# Patient Record
Sex: FEMALE | Race: BLACK OR AFRICAN AMERICAN | NOT HISPANIC OR LATINO | Employment: UNEMPLOYED | URBAN - METROPOLITAN AREA
[De-identification: names, ages, dates, MRNs, and addresses within clinical notes are randomized per-mention and may not be internally consistent; named-entity substitution may affect disease eponyms.]

---

## 2020-12-03 ENCOUNTER — APPOINTMENT (EMERGENCY)
Dept: CT IMAGING | Facility: HOSPITAL | Age: 50
End: 2020-12-03
Payer: COMMERCIAL

## 2020-12-03 ENCOUNTER — HOSPITAL ENCOUNTER (EMERGENCY)
Facility: HOSPITAL | Age: 50
Discharge: HOME | End: 2020-12-03
Attending: EMERGENCY MEDICINE | Admitting: EMERGENCY MEDICINE
Payer: COMMERCIAL

## 2020-12-03 VITALS
SYSTOLIC BLOOD PRESSURE: 169 MMHG | TEMPERATURE: 97.6 F | HEART RATE: 62 BPM | HEIGHT: 63 IN | RESPIRATION RATE: 16 BRPM | DIASTOLIC BLOOD PRESSURE: 73 MMHG | OXYGEN SATURATION: 98 %

## 2020-12-03 DIAGNOSIS — R00.2 PALPITATIONS: Primary | ICD-10-CM

## 2020-12-03 DIAGNOSIS — R42 LIGHTHEADEDNESS: ICD-10-CM

## 2020-12-03 DIAGNOSIS — R42 DIZZINESS: ICD-10-CM

## 2020-12-03 LAB
ANION GAP SERPL CALCULATED.3IONS-SCNC: 9 MMOL/L (ref 4–13)
BASOPHILS # BLD AUTO: 0.05 THOUSANDS/ΜL (ref 0–0.1)
BASOPHILS NFR BLD AUTO: 1 % (ref 0–1)
BUN SERPL-MCNC: 9 MG/DL (ref 5–25)
CALCIUM SERPL-MCNC: 8.8 MG/DL (ref 8.3–10.1)
CHLORIDE SERPL-SCNC: 108 MMOL/L (ref 100–108)
CO2 SERPL-SCNC: 27 MMOL/L (ref 21–32)
CREAT SERPL-MCNC: 0.82 MG/DL (ref 0.6–1.3)
EOSINOPHIL # BLD AUTO: 0.06 THOUSAND/ΜL (ref 0–0.61)
EOSINOPHIL NFR BLD AUTO: 1 % (ref 0–6)
ERYTHROCYTE [DISTWIDTH] IN BLOOD BY AUTOMATED COUNT: 13.1 % (ref 11.6–15.1)
GFR SERPL CREATININE-BSD FRML MDRD: 95 ML/MIN/1.73SQ M
GLUCOSE SERPL-MCNC: 99 MG/DL (ref 65–140)
HCT VFR BLD AUTO: 40.9 % (ref 34.8–46.1)
HGB BLD-MCNC: 13.2 G/DL (ref 11.5–15.4)
IMM GRANULOCYTES # BLD AUTO: 0.04 THOUSAND/UL (ref 0–0.2)
IMM GRANULOCYTES NFR BLD AUTO: 0 % (ref 0–2)
LYMPHOCYTES # BLD AUTO: 1.19 THOUSANDS/ΜL (ref 0.6–4.47)
LYMPHOCYTES NFR BLD AUTO: 12 % (ref 14–44)
MAGNESIUM SERPL-MCNC: 1.9 MG/DL (ref 1.6–2.6)
MCH RBC QN AUTO: 27.5 PG (ref 26.8–34.3)
MCHC RBC AUTO-ENTMCNC: 32.3 G/DL (ref 31.4–37.4)
MCV RBC AUTO: 85 FL (ref 82–98)
MONOCYTES # BLD AUTO: 0.53 THOUSAND/ΜL (ref 0.17–1.22)
MONOCYTES NFR BLD AUTO: 5 % (ref 4–12)
NEUTROPHILS # BLD AUTO: 8.05 THOUSANDS/ΜL (ref 1.85–7.62)
NEUTS SEG NFR BLD AUTO: 81 % (ref 43–75)
NRBC BLD AUTO-RTO: 0 /100 WBCS
PLATELET # BLD AUTO: 200 THOUSANDS/UL (ref 149–390)
PMV BLD AUTO: 11.1 FL (ref 8.9–12.7)
POTASSIUM SERPL-SCNC: 3.5 MMOL/L (ref 3.5–5.3)
RBC # BLD AUTO: 4.8 MILLION/UL (ref 3.81–5.12)
SODIUM SERPL-SCNC: 144 MMOL/L (ref 136–145)
TROPONIN I SERPL-MCNC: <0.02 NG/ML
TSH SERPL DL<=0.05 MIU/L-ACNC: 1.77 UIU/ML (ref 0.36–3.74)
WBC # BLD AUTO: 9.92 THOUSAND/UL (ref 4.31–10.16)

## 2020-12-03 PROCEDURE — 70496 CT ANGIOGRAPHY HEAD: CPT

## 2020-12-03 PROCEDURE — 83735 ASSAY OF MAGNESIUM: CPT | Performed by: EMERGENCY MEDICINE

## 2020-12-03 PROCEDURE — 84484 ASSAY OF TROPONIN QUANT: CPT | Performed by: EMERGENCY MEDICINE

## 2020-12-03 PROCEDURE — 70498 CT ANGIOGRAPHY NECK: CPT

## 2020-12-03 PROCEDURE — 80048 BASIC METABOLIC PNL TOTAL CA: CPT | Performed by: EMERGENCY MEDICINE

## 2020-12-03 PROCEDURE — 93005 ELECTROCARDIOGRAM TRACING: CPT

## 2020-12-03 PROCEDURE — G1004 CDSM NDSC: HCPCS

## 2020-12-03 PROCEDURE — 84443 ASSAY THYROID STIM HORMONE: CPT | Performed by: EMERGENCY MEDICINE

## 2020-12-03 PROCEDURE — 99285 EMERGENCY DEPT VISIT HI MDM: CPT

## 2020-12-03 PROCEDURE — 96365 THER/PROPH/DIAG IV INF INIT: CPT

## 2020-12-03 PROCEDURE — 85025 COMPLETE CBC W/AUTO DIFF WBC: CPT | Performed by: EMERGENCY MEDICINE

## 2020-12-03 PROCEDURE — 36415 COLL VENOUS BLD VENIPUNCTURE: CPT | Performed by: EMERGENCY MEDICINE

## 2020-12-03 PROCEDURE — 99285 EMERGENCY DEPT VISIT HI MDM: CPT | Performed by: EMERGENCY MEDICINE

## 2020-12-03 RX ADMIN — SODIUM CHLORIDE, SODIUM LACTATE, POTASSIUM CHLORIDE, AND CALCIUM CHLORIDE 1000 ML: .6; .31; .03; .02 INJECTION, SOLUTION INTRAVENOUS at 19:39

## 2020-12-03 RX ADMIN — IOHEXOL 100 ML: 350 INJECTION, SOLUTION INTRAVENOUS at 21:23

## 2020-12-04 LAB
ATRIAL RATE: 71 BPM
P AXIS: 75 DEGREES
PR INTERVAL: 156 MS
QRS AXIS: 13 DEGREES
QRSD INTERVAL: 106 MS
QT INTERVAL: 418 MS
QTC INTERVAL: 454 MS
T WAVE AXIS: 75 DEGREES
VENTRICULAR RATE: 71 BPM

## 2020-12-04 PROCEDURE — 93010 ELECTROCARDIOGRAM REPORT: CPT | Performed by: INTERNAL MEDICINE

## 2021-01-07 ENCOUNTER — TELEMEDICINE (OUTPATIENT)
Dept: DERMATOLOGY | Age: 51
End: 2021-01-07
Payer: COMMERCIAL

## 2021-01-07 DIAGNOSIS — L81.1 MELASMA: Primary | ICD-10-CM

## 2021-01-07 DIAGNOSIS — L81.8 POSTINFLAMMATORY HYPOPIGMENTATION: ICD-10-CM

## 2021-01-07 DIAGNOSIS — L57.8 ACTINIC SKIN DAMAGE: ICD-10-CM

## 2021-01-07 DIAGNOSIS — L21.9 SEBORRHEA: ICD-10-CM

## 2021-01-07 PROCEDURE — 99204 OFFICE O/P NEW MOD 45 MIN: CPT | Performed by: DERMATOLOGY

## 2021-01-07 RX ORDER — ASPIRIN 325 MG
325 TABLET ORAL DAILY
COMMUNITY

## 2021-01-07 RX ORDER — OMEPRAZOLE 40 MG/1
40 CAPSULE, DELAYED RELEASE ORAL DAILY
COMMUNITY

## 2021-01-07 RX ORDER — TRIAMCINOLONE ACETONIDE 0.25 MG/G
CREAM TOPICAL DAILY
Qty: 80 G | Refills: 1 | Status: SHIPPED | OUTPATIENT
Start: 2021-01-07 | End: 2021-07-27 | Stop reason: SDUPTHER

## 2021-01-07 NOTE — PROGRESS NOTES
Virtual Regular Visit      Assessment/Plan:    Problem List Items Addressed This Visit     None               Reason for visit is   Chief Complaint   Patient presents with    Virtual Regular Visit        Encounter provider Dori Pompa MD    Provider located at [place] [id number] [phone]      Recent Visits  No visits were found meeting these conditions  Showing recent visits within past 7 days and meeting all other requirements     Future Appointments  No visits were found meeting these conditions  Showing future appointments within next 150 days and meeting all other requirements        The patient was identified by name and date of birth  Sun White was informed that this is a telemedicine visit and that the visit is being conducted through [facility] and patient was informed that this is a secure, HIPAA-compliant platform  She agrees to proceed     My office door was closed  The patient was notified the following individuals were present in the room [facility]  She acknowledged consent and understanding of privacy and security of the video platform  The patient has agreed to participate and understands they can discontinue the visit at any time  Patient is aware this is a billable service  Subjective  Sun White is a 44 y o  female    HPI     Past Medical History:   Diagnosis Date    Arterial stenosis ([address] )     Splenic infarct     Stroke [facility])        Past Surgical History:   Procedure Laterality Date     SECTION         Current Outpatient Medications   Medication Sig Dispense Refill    aspirin 325 mg tablet Take 325 mg by mouth daily      omeprazole (PriLOSEC) 40 MG capsule Take 40 mg by mouth daily       No current facility-administered medications for this visit           Allergies   Allergen Reactions    Penicillin G Benzathine Shortness Of Breath     Other reaction(s): (PIMS) shortness of breath    Erythromycin Rash     Other reaction(s): (PIMS)       Review of Systems    Video Exam    There were no vitals filed for this visit  Physical Exam     I spent 15 minutes directly with the patient during this visit      VIRTUAL VISIT [address] [facility] [address] acknowledges that she has consented to an online visit or consultation  She understands that the online visit is based solely on information provided by her, and that, in the absence of a face-to-face physical evaluation by the physician, the diagnosis she receives is both limited and provisional in terms of accuracy and completeness  This is not intended to replace a full medical face-to-face evaluation by the physician  Jaimie Bailey understands and accepts these terms  [facility] VIRTUAL Clinic Note     Patient Name: Jaimie Bailey  Encounter Date: [phone]       Have you been cared for by a [facility] [facility] Dermatologist in the last 3 years and, if so, which one? No    · Have you traveled outside of the [address] [place] [address] in the past 3 months or outside of the [facility] area in the last 2 weeks? No     May we call your Preferred Phone number to discuss your specific medical information? Yes     May we leave a detailed message that includes your specific medical information? Yes      Today's Chief Concerns:   Concern #1:  Melasma   Concern #2:      Past Medical History:  Have you personally ever had or currently have any of the following? · Skin cancer (such as Melanoma, Basal Cell Carcinoma, Squamous Cell Carcinoma? (If Yes, please provide more detail)- No  · Eczema: No  · Psoriasis: No  · HIV/AIDS: No  · Hepatitis B or C: No  · Tuberculosis: No  · Systemic Immunosuppression such as Diabetes, Biologic or Immunotherapy, Chemotherapy, Organ Transplantation, Bone Marrow Transplantation (If YES, please provide more detail):  No  · Radiation Treatment (If YES, please provide more detail): No  · Any other major medical conditions/concerns? (If Yes, which types)- YES, hx of protein deficiciency    Social History:     What is/was your primary occupation? unemployed     What are your hobbies/past-times? landscaping    Family History:  Have any of your "first degree relatives" (parent, brother, sister, or child) had any of the following       · Skin cancer such as Melanoma or Merkel Cell Carcinoma or Pancreatic Cancer? No  · Eczema, Asthma, Hay Fever or Seasonal Allergies: YES, mother hx of eczema and asthma, son hx of asthma  · Psoriasis or Psoriatic Arthritis: YES, mother hx of psoriasis  · Do any other medical conditions seem to run in your family? If Yes, what condition and which relatives? YES, mother hx of diabetes hypertension, father hx of diabetes, hypertension and colon cancer    Current Medications:   (please update all dermatological medications before printing patient's AVS!)      Current Outpatient Medications:     aspirin 325 mg tablet, Take 325 mg by mouth daily, Disp: , Rfl:     omeprazole (PriLOSEC) 40 MG capsule, Take 40 mg by mouth daily, Disp: , Rfl:       Review of Systems:  Have you recently had or currently have any of the following? If YES, what are you doing for the problem? · Fever, chills or unintended weight loss: No  · Sudden loss or change in your vision: No  · Nausea, vomiting or blood in your stool: No  · Painful or swollen joints: No  · Wheezing or cough: No  · Changing mole or non-healing wound: No  · Nosebleeds: No  · Excessive sweating: No  · Easy or prolonged bleeding? No  · Over the last 2 weeks, how often have you been bothered by the following problems? · Taking little interest or pleasure in doing things: 1 - Not at All  · Feeling down, depressed, or hopeless: 1 - Not at All  · Rapid heartbeat with epinephrine:  No    · FEMALES ONLY:    · Are you pregnant or planning to become pregnant?  No  · Are you currently or planning to be nursing or breast feeding? No    · Any known allergies? Allergies   Allergen Reactions    Penicillin G Benzathine Shortness Of Breath     Other reaction(s): (PIMS) shortness of breath    Erythromycin Rash     Other reaction(s): (PIMS)   ·       Physical Exam:     Was a chaperone (Derm Clinical Assistant) present throughout the entire virtual Physical Exam? Yes     Did the Dermatology Team specifically  the patient on the technical and practical limitations of a virtual Physical Exam? Yes}  o Did the patient ultimately request or accept a virtual Physical Exam?  Yes  o Did the patient specifically refuse to have the areas "under-the-bra" examined by the Dermatologist? YES  o Did the patient specifically refuse to have the areas "under-the-underwear" examined by the Dermatologist? YES    CONSTITUTIONAL:   Appearance: alert, well appearing, and in no distress  PSYCH: Normal mood and affect  EYES: Normal appearing eyes with normal color; no obvious deformities  ENT: Normal ears, nose, lips and neck with normal color and no obvious deformities; no obvious difficulty swallowing  CARDIOVASCULAR: No obvious edema; no obvious jugular venous distension  RESPIRATORY: Normal appearing respirations; no obvious shortness of breath  HEME/LYMPH/IMMUNO:  Normal color without obvious pallor, jaundice, petechiae or bleeding; no obvious cervical chain masses    SKIN:  FULL ORGAN SYSTEM EXAM  Face Normal except as noted below in Assessment         MELASMA and/or ACTINIC DAMAGE with   SEBHORRHEA WITH POST INFLAMMATORY HYPOPIGMENTATION RULE OUT VITILIGO    Physical Exam:   Anatomic Location Affected:  Central and edge of face   Morphological Description:  Pink and hypopigmented; edge of face- blotchy tan and brown patches   Pertinent Positives:   Pertinent Negatives: Additional History of Present Condition:  Patient reports dx with melasma 10 years ago  Denies treating at this time  Grandson and 2 brothers dx with vitiligo  Assessment and Plan: Patient has hypopigmentation with suggestion of mild seborrhea in central face with blotchy peripheral hyperpigmentation on sides of face  Since there is a family history of vitiligo she needs to be examined via wood's light to exclude this  Her outdoor occupation creates the melasma and hyperpigmentation from actinic damage  We will start with a topical steroid only and if there is no vitiligo add tretinoin and hydroquinone  Based on a thorough discussion of this condition and the management approach to it (including a comprehensive discussion of the known risks, side effects and potential benefits of treatment), the patient (family) agrees to implement the following specific plan:   Apply Triamcinolone 0 025% cream to face once a day   Recommend an in office visit to confirm diagnosis   Neutrogena Daily Defense SPF 50+ at least three times a day   polypodium leucotomas daily for sun protection and improvement in melasma    What is melasma? Melasma is a chronic skin disorder that results in symmetrical, blotchy, brownish facial pigmentation  It can lead to considerable embarrassment and distress  This form of facial pigmentation is sometimes called chloasma, but as this means green skin, the term melasma (brown skin) is preferred  Who gets melasma? Melasma is more common in women than in men; only 1-in-4 to 1-in-20 affected individuals are male, depending on the population studied  It generally starts between the age of 21 and 36 years, but it can begin in childhood or not until middle age  Melasma is more common in people that tan well or have naturally brown skin (Man skin types 3 and 4) compared with those who have fair skin (skin types 1 and 2) or black skin (skin types 5 or 6)  What causes melasma? The cause of melasma is complex   The pigmentation is due to overproduction of melanin by the pigment cells, melanocytes, which is taken up by the keratinocytes (epidermal melanosis) and/or deposited in the dermis (dermal melanosis, melanophages)  There is a genetic predisposition to melasma, with at least one-third of patients reporting other family members to be affected  In most people melasma is a chronic disorder  Known triggers for melasma include:   Sun exposure and sun damage--this is the most important avoidable risk factor    Pregnancy--in affected women, the pigment often fades a few months after delivery    Hormone treatments--oral contraceptive pills containing oestrogen and/or progesterone, hormone replacement, intrauterine devices and implants are a factor in about a quarter of affected women    Certain medications (including new targeted therapies for cancer), scented or deodorant soaps, toiletries and cosmetics--these may cause a phototoxic reaction that triggers melasma, which may then persist long term    Hypothyroidism (low levels of circulating thyroid hormone)    Melasma commonly arises in healthy, non-pregnant adults  Lifelong sun exposure causes deposition of pigment within the dermis and this often persists longterm  Exposure to ultraviolet radiation (UVR) deepens the pigmentation because it activates the melanocytes to produce more melanin  Research is attempting to pinpoint the roles of stem cell, neural, vascular and local hormonal factors in promoting melanocyte activation  What are the clinical features of melasma? Melasma presents as macules (freckle-like spots) and larger flat brown patches  These are found on both sides of the face and have an irregular border  There are several distinct patterns     Centrofacial pattern: forehead, cheeks, nose and upper lips    Malar pattern: cheeks and nose    Lateral cheek pattern    Mandibular pattern: jawline    Reddened or inflamed forms of melasma (also called erythrosis pigmentosa faciei)    Poikiloderma of [place]: reddened, photoaging changes seen on the sides of the neck, mostly affecting patients older than 50 years    Brachial type of melasma affecting shoulders and upper arms (also called acquired brachial cutaneous dyschromatosis)  Melasma is sometimes  into epidermal (skin surface), dermal (deeper) and mixed types  A Wood lamp that emits black light (UVA1) may be used to identify the depth of the pigment  Some general classifications include the following:    Epidermal melasma   Well-defined border    Dark brown colour    Appears more obvious under black light    Responds well to treatment    Dermal melasma   Ill-defined border    Light brown or bluish in colour    Unchanged under black light    Responds poorly to treatment    Mixed melasma   The most common type    Combination of bluish, light and dark brown patches    Mixed pattern seen under black light    Partial improvement with treatment    What is the treatment of melasma? Melasma can be very slow to respond to treatment, especially if it has been present for a long time  Treatment may result in irritant contact dermatitis in patients with sensitive skin, and this can result in post-inflammatory pigmentation  Generally a combination of the following measures is helpful  General measures   Discontinue hormonal contraception   Year-round life-long sun protection  Wear a broad-brimmed hat   Use broad-spectrum very high protection factor (SPF 50+) sunscreen applied to the whole face daily, year-round  It should be reapplied every 2 hours if outdoors during the summer months  Sunscreens containing iron oxides are preferred, as they screen out some visible light as well as ultraviolet radiation  Alternatively or as well, use a make-up that contains sunscreen   Use a mild cleanser, and if the skin is dry, a light moisturiser   Cosmetic camouflage (make-up) is invaluable to disguise the pigment  Topical therapy  Tyrosinase inhibitors are the mainstay of treatment   The aim is to prevent new pigment formation by inhibiting formation of melanin by the melanocytes   Hydroquinone 2-4% as cream or lotion, applied accurately to pigmented areas at night for 2-4 months  This may cause contact dermatitis (stinging and redness) in 25% of patients  It should not be used in higher concentration or for prolonged courses as it has been associated with ochronosis (a bluish grey discolouration similar to that seen in alkaptonuria)   Azelaic acid cream, lotion or gel can be applied twice daily long term, and is safe in pregnancy  This may also sting   Kojic acid or kojic acid dipalmitate is often included in formulations, as it binds copper, required by L-DOPA (a cofactor of tyrosinase)  Kojic acid can cause irritant contact dermatitis and less commonly, allergic contact dermatitis   The mechanism of action of cysteamine cream is unclear, but is thought to involve inhibition of tyrosinase  A study of 50 patients with melasma found cysteamine cream to be significantly more effective than placebo cream     Ascorbic acid (vitamin C) also acts through copper to inhibit pigment production  It is well tolerated but highly unstable, so is usually combined with other agents   Methimazole (antithyroid drug) cream has been reported to reduce melanin synthesis and pigmentation in hydroquinone-resistant melasma   New agents under investigation include zinc sulfate mequinol, arbutin and deoxyarbutin (from berries), licorice extract, rucinol, resveratrol, 4-hydroxy-anisole, 2,5-dimethyl-4-hydroxy-3(2H)-furanone and/or N-acetyl glucosamine    Other active compounds used for melasma include:   Topical corticosteroids such as hydrocortisone  These work quickly to fade the colour and reduce the likelihood of contact dermatitis caused by other agents  Potent topical steroids are best avoided due to their potential to cause adverse effects      Soybean extract, which is thought to reduce the transfer of pigment from melanocytes to skin cells (keratinocytes) and to inhibit receptors   Tranexamic acid has been used experimentally for melasma as a cream or injected into the skin (mesotherapy), showing some benefit  It may cause allergy or irritation  Superficial or epidermal pigment can be peeled off  Peeling can also allow tyrosinase inhibitors to penetrate more effectively  These must be done carefully as peels may also induce post-inflammatory pigmentation   Topical alpha hydroxyacids including glycolic acid and lactic acid, as creams or as repeated superficial chemical peels, remove the surface skin and their low pH inhibits the activity of tyrosinase   Topical retinoids, such as tretinoin (a prescription medicine) are effective  Tretinoin can be hard to tolerate and sometimes causes contact dermatitis  Do not use during pregnancy   Salicylic acid, a common peeling ingredient in skin creams, can also be used for chemical peels, but it is not very effective in melasma  The most successful formulation has been a combination of hydroquinone, tretinoin, and moderate potency topical steroid  This has been found to result in improvement or clearance in up to 60-80% of those treated  Many other combinations of topical agents are in common use, as they are more effective than any one alone  However, these products are often expensive  Oral treatment of melasma  Oral medications for melasma are under investigation, including tranexamic acid  Tranexamic acid is a lysine analogue that inhibits plasmin and is usually used orally to stop bleeding  It reduces production of prostaglandins, the precursors of tyrosine  In low dose, tranexamic acid has been reported to be effective and safe in the treatment of melasma, providing patients have been carefully selected and are at low risk of thromboembolic disease    Glutathione is also under investigation as a systemic skin whitening agent, but has potentially serious adverse effects  Devices used to treat melasma  The ideal treatment for melasma would destroy the pigment, while leaving the cells alone  Unfortunately, this is hard to achieve  Machines can be used to remove epidermal pigmentation but with caution--over-treatment may cause postinflammatory pigmentation  Patients should be pretreated with a tyrosinase inhibitor (see above)  Fractional lasers, Q-switched Nd:YAG lasers and intense pulsed light (IPL) appear to be the most suitable options  Several treatments may be necessary and post-inflammatory hyperpigmentation may complicate recovery  Carbon dioxide or erbium:YAG resurfacing lasers, pigment lasers (Q-switched gary and Alexandrite devices) and mechanical dermabrasion and microdermabrasion should be used with caution in the treatment of melasma  What is the outcome of treatment of melasma? Results take time and the above measures are rarely completely successful  Unfortunately, even in those that get a good result from treatment, pigmentation may reappear on exposure to summer sun and/or because of hormonal factors  New topical and oral agents are being studied and offer hope for effective treatments in the future                  Scribe Attestation    I,:  Yeimi Wren am acting as a scribe while in the presence of the attending physician :       I,:  Lizzette Madden MD personally performed the services described in this documentation    as scribed in my presence :

## 2021-01-07 NOTE — PATIENT INSTRUCTIONS
MELASMA  SEBHORRHEA WITH POST INFLAMMATORY HYPOPIGMENTATION RULE OUT VITILIGO    Assessment and Plan:  Based on a thorough discussion of this condition and the management approach to it (including a comprehensive discussion of the known risks, side effects and potential benefits of treatment), the patient (family) agrees to implement the following specific plan:   Apply Triamcinolone 0 025% cream to face once a day   Recommend an in office visit to confirm diagnosis   Neutrogena Daily Defense SPF 50+ at least three times a day    What is melasma? Melasma is a chronic skin disorder that results in symmetrical, blotchy, brownish facial pigmentation  It can lead to considerable embarrassment and distress  This form of facial pigmentation is sometimes called chloasma, but as this means green skin, the term melasma (brown skin) is preferred  Who gets melasma? Melasma is more common in women than in men; only 1-in-4 to 1-in-20 affected individuals are male, depending on the population studied  It generally starts between the age of 21 and 36 years, but it can begin in childhood or not until middle age  Melasma is more common in people that tan well or have naturally brown skin (Man skin types 3 and 4) compared with those who have fair skin (skin types 1 and 2) or black skin (skin types 5 or 6)  What causes melasma? The cause of melasma is complex  The pigmentation is due to overproduction of melanin by the pigment cells, melanocytes, which is taken up by the keratinocytes (epidermal melanosis) and/or deposited in the dermis (dermal melanosis, melanophages)  There is a genetic predisposition to melasma, with at least one-third of patients reporting other family members to be affected  In most people melasma is a chronic disorder    Known triggers for melasma include:   Sun exposure and sun damage--this is the most important avoidable risk factor    Pregnancy--in affected women, the pigment often fades a few months after delivery    Hormone treatments--oral contraceptive pills containing oestrogen and/or progesterone, hormone replacement, intrauterine devices and implants are a factor in about a quarter of affected women    Certain medications (including new targeted therapies for cancer), scented or deodorant soaps, toiletries and cosmetics--these may cause a phototoxic reaction that triggers melasma, which may then persist long term    Hypothyroidism (low levels of circulating thyroid hormone)    Melasma commonly arises in healthy, non-pregnant adults  Lifelong sun exposure causes deposition of pigment within the dermis and this often persists longterm  Exposure to ultraviolet radiation (UVR) deepens the pigmentation because it activates the melanocytes to produce more melanin  Research is attempting to pinpoint the roles of stem cell, neural, vascular and local hormonal factors in promoting melanocyte activation  What are the clinical features of melasma? Melasma presents as macules (freckle-like spots) and larger flat brown patches  These are found on both sides of the face and have an irregular border  There are several distinct patterns   Centrofacial pattern: forehead, cheeks, nose and upper lips    Malar pattern: cheeks and nose    Lateral cheek pattern    Mandibular pattern: jawline    Reddened or inflamed forms of melasma (also called erythrosis pigmentosa faciei)    Poikiloderma of [place]: reddened, photoaging changes seen on the sides of the neck, mostly affecting patients older than 50 years    Brachial type of melasma affecting shoulders and upper arms (also called acquired brachial cutaneous dyschromatosis)  Melasma is sometimes  into epidermal (skin surface), dermal (deeper) and mixed types  A Wood lamp that emits black light (UVA1) may be used to identify the depth of the pigment    Some general classifications include the following:    Epidermal melasma   Well-defined border    Dark brown colour    Appears more obvious under black light    Responds well to treatment    Dermal melasma   Ill-defined border    Light brown or bluish in colour    Unchanged under black light    Responds poorly to treatment    Mixed melasma   The most common type    Combination of bluish, light and dark brown patches    Mixed pattern seen under black light    Partial improvement with treatment    What is the treatment of melasma? Melasma can be very slow to respond to treatment, especially if it has been present for a long time  Treatment may result in irritant contact dermatitis in patients with sensitive skin, and this can result in post-inflammatory pigmentation  Generally a combination of the following measures is helpful  General measures   Discontinue hormonal contraception   Year-round life-long sun protection  Wear a broad-brimmed hat   Use broad-spectrum very high protection factor (SPF 50+) sunscreen applied to the whole face daily, year-round  It should be reapplied every 2 hours if outdoors during the summer months  Sunscreens containing iron oxides are preferred, as they screen out some visible light as well as ultraviolet radiation  Alternatively or as well, use a make-up that contains sunscreen   Use a mild cleanser, and if the skin is dry, a light moisturiser   Cosmetic camouflage (make-up) is invaluable to disguise the pigment  Topical therapy  Tyrosinase inhibitors are the mainstay of treatment  The aim is to prevent new pigment formation by inhibiting formation of melanin by the melanocytes   Hydroquinone 2-4% as cream or lotion, applied accurately to pigmented areas at night for 2-4 months  This may cause contact dermatitis (stinging and redness) in 25% of patients   It should not be used in higher concentration or for prolonged courses as it has been associated with ochronosis (a bluish grey discolouration similar to that seen in alkaptonuria)   Azelaic acid cream, lotion or gel can be applied twice daily long term, and is safe in pregnancy  This may also sting   Kojic acid or kojic acid dipalmitate is often included in formulations, as it binds copper, required by L-DOPA (a cofactor of tyrosinase)  Kojic acid can cause irritant contact dermatitis and less commonly, allergic contact dermatitis   The mechanism of action of cysteamine cream is unclear, but is thought to involve inhibition of tyrosinase  A study of 50 patients with melasma found cysteamine cream to be significantly more effective than placebo cream     Ascorbic acid (vitamin C) also acts through copper to inhibit pigment production  It is well tolerated but highly unstable, so is usually combined with other agents   Methimazole (antithyroid drug) cream has been reported to reduce melanin synthesis and pigmentation in hydroquinone-resistant melasma   New agents under investigation include zinc sulfate mequinol, arbutin and deoxyarbutin (from berries), licorice extract, rucinol, resveratrol, 4-hydroxy-anisole, 2,5-dimethyl-4-hydroxy-3(2H)-furanone and/or N-acetyl glucosamine    Other active compounds used for melasma include:   Topical corticosteroids such as hydrocortisone  These work quickly to fade the colour and reduce the likelihood of contact dermatitis caused by other agents  Potent topical steroids are best avoided due to their potential to cause adverse effects   Soybean extract, which is thought to reduce the transfer of pigment from melanocytes to skin cells (keratinocytes) and to inhibit receptors   Tranexamic acid has been used experimentally for melasma as a cream or injected into the skin (mesotherapy), showing some benefit  It may cause allergy or irritation  Superficial or epidermal pigment can be peeled off  Peeling can also allow tyrosinase inhibitors to penetrate more effectively   These must be done carefully as peels may also induce post-inflammatory pigmentation   Topical alpha hydroxyacids including glycolic acid and lactic acid, as creams or as repeated superficial chemical peels, remove the surface skin and their low pH inhibits the activity of tyrosinase   Topical retinoids, such as tretinoin (a prescription medicine) are effective  Tretinoin can be hard to tolerate and sometimes causes contact dermatitis  Do not use during pregnancy   Salicylic acid, a common peeling ingredient in skin creams, can also be used for chemical peels, but it is not very effective in melasma  The most successful formulation has been a combination of hydroquinone, tretinoin, and moderate potency topical steroid  This has been found to result in improvement or clearance in up to 60-80% of those treated  Many other combinations of topical agents are in common use, as they are more effective than any one alone  However, these products are often expensive  Oral treatment of melasma  Oral medications for melasma are under investigation, including tranexamic acid  Tranexamic acid is a lysine analogue that inhibits plasmin and is usually used orally to stop bleeding  It reduces production of prostaglandins, the precursors of tyrosine  In low dose, tranexamic acid has been reported to be effective and safe in the treatment of melasma, providing patients have been carefully selected and are at low risk of thromboembolic disease  Glutathione is also under investigation as a systemic skin whitening agent, but has potentially serious adverse effects  Devices used to treat melasma  The ideal treatment for melasma would destroy the pigment, while leaving the cells alone  Unfortunately, this is hard to achieve  Machines can be used to remove epidermal pigmentation but with caution--over-treatment may cause postinflammatory pigmentation  Patients should be pretreated with a tyrosinase inhibitor (see above)    Fractional lasers, Q-switched Nd:YAG lasers and intense pulsed light (IPL) appear to be the most suitable options  Several treatments may be necessary and post-inflammatory hyperpigmentation may complicate recovery  Carbon dioxide or erbium:YAG resurfacing lasers, pigment lasers (Q-switched gary and Alexandrite devices) and mechanical dermabrasion and microdermabrasion should be used with caution in the treatment of melasma  What is the outcome of treatment of melasma? Results take time and the above measures are rarely completely successful  Unfortunately, even in those that get a good result from treatment, pigmentation may reappear on exposure to summer sun and/or because of hormonal factors  New topical and oral agents are being studied and offer hope for effective treatments in the future

## 2021-02-16 ENCOUNTER — OFFICE VISIT (OUTPATIENT)
Dept: DERMATOLOGY | Facility: CLINIC | Age: 51
End: 2021-02-16
Payer: COMMERCIAL

## 2021-02-16 VITALS — TEMPERATURE: 98.5 F | BODY MASS INDEX: 33.66 KG/M2 | HEIGHT: 63 IN | WEIGHT: 190 LBS

## 2021-02-16 DIAGNOSIS — L81.1 MELASMA: Primary | ICD-10-CM

## 2021-02-16 PROCEDURE — 99213 OFFICE O/P EST LOW 20 MIN: CPT | Performed by: DERMATOLOGY

## 2021-02-16 RX ORDER — IBUPROFEN 600 MG/1
600 TABLET ORAL 3 TIMES DAILY PRN
COMMUNITY
Start: 2021-02-08 | End: 2022-02-08

## 2021-02-16 NOTE — PATIENT INSTRUCTIONS
Assessment and Plan:  Based on a thorough discussion of this condition and the management approach to it (including a comprehensive discussion of the known risks, side effects and potential benefits of treatment), the patient (family) agrees to implement the following specific plan:   When outside we recommend using a wide brim hat, sunglasses, long sleeve and pants, sunscreen with SPF 36+ with reapplication every 2 hours, or SPF specific clothing    Neutrogena Daily Defense SPF 50+ at least three times a day  [employer]  April Theodore will send a compounding [facility]   Follow up as needed or in 6 months      What is melasma? Melasma is a chronic skin disorder that results in symmetrical, blotchy, brownish facial pigmentation  It can lead to considerable embarrassment and distress  This form of facial pigmentation is sometimes called chloasma, but as this means green skin, the term melasma (brown skin) is preferred  Who gets melasma? Melasma is more common in women than in men; only 1-in-4 to 1-in-20 affected individuals are male, depending on the population studied  It generally starts between the age of 21 and 36 years, but it can begin in childhood or not until middle age  Melasma is more common in people that tan well or have naturally brown skin (Man skin types 3 and 4) compared with those who have fair skin (skin types 1 and 2) or black skin (skin types 5 or 6)  What causes melasma? The cause of melasma is complex  The pigmentation is due to overproduction of melanin by the pigment cells, melanocytes, which is taken up by the keratinocytes (epidermal melanosis) and/or deposited in the dermis (dermal melanosis, melanophages)  There is a genetic predisposition to melasma, with at least one-third of patients reporting other family members to be affected  In most people melasma is a chronic disorder    Known triggers for melasma include:   Sun exposure and sun damage--this is the most important avoidable risk factor    Pregnancy--in affected women, the pigment often fades a few months after delivery    Hormone treatments--oral contraceptive pills containing oestrogen and/or progesterone, hormone replacement, intrauterine devices and implants are a factor in about a quarter of affected women    Certain medications (including new targeted therapies for cancer), scented or deodorant soaps, toiletries and cosmetics--these may cause a phototoxic reaction that triggers melasma, which may then persist long term    Hypothyroidism (low levels of circulating thyroid hormone)    Melasma commonly arises in healthy, non-pregnant adults  Lifelong sun exposure causes deposition of pigment within the dermis and this often persists longterm  Exposure to ultraviolet radiation (UVR) deepens the pigmentation because it activates the melanocytes to produce more melanin  Research is attempting to pinpoint the roles of stem cell, neural, vascular and local hormonal factors in promoting melanocyte activation  What are the clinical features of melasma? Melasma presents as macules (freckle-like spots) and larger flat brown patches  These are found on both sides of the face and have an irregular border  There are several distinct patterns   Centrofacial pattern: forehead, cheeks, nose and upper lips    Malar pattern: cheeks and nose    Lateral cheek pattern    Mandibular pattern: jawline    Reddened or inflamed forms of melasma (also called erythrosis pigmentosa faciei)    Poikiloderma of [place]: reddened, photoaging changes seen on the sides of the neck, mostly affecting patients older than 50 years    Brachial type of melasma affecting shoulders and upper arms (also called acquired brachial cutaneous dyschromatosis)  Melasma is sometimes  into epidermal (skin surface), dermal (deeper) and mixed types   A Wood lamp that emits black light (UVA1) may be used to identify the depth of the pigment  Some general classifications include the following:    Epidermal melasma   Well-defined border    Dark brown colour    Appears more obvious under black light    Responds well to treatment    Dermal melasma   Ill-defined border    Light brown or bluish in colour    Unchanged under black light    Responds poorly to treatment    Mixed melasma   The most common type    Combination of bluish, light and dark brown patches    Mixed pattern seen under black light    Partial improvement with treatment    What is the treatment of melasma? Melasma can be very slow to respond to treatment, especially if it has been present for a long time  Treatment may result in irritant contact dermatitis in patients with sensitive skin, and this can result in post-inflammatory pigmentation  Generally a combination of the following measures is helpful  General measures   Discontinue hormonal contraception   Year-round life-long sun protection  Wear a broad-brimmed hat   Use broad-spectrum very high protection factor (SPF 50+) sunscreen applied to the whole face daily, year-round  It should be reapplied every 2 hours if outdoors during the summer months  Sunscreens containing iron oxides are preferred, as they screen out some visible light as well as ultraviolet radiation  Alternatively or as well, use a make-up that contains sunscreen   Use a mild cleanser, and if the skin is dry, a light moisturiser   Cosmetic camouflage (make-up) is invaluable to disguise the pigment  Topical therapy  Tyrosinase inhibitors are the mainstay of treatment  The aim is to prevent new pigment formation by inhibiting formation of melanin by the melanocytes   Hydroquinone 2-4% as cream or lotion, applied accurately to pigmented areas at night for 2-4 months  This may cause contact dermatitis (stinging and redness) in 25% of patients   It should not be used in higher concentration or for prolonged courses as it has been associated with ochronosis (a bluish grey discolouration similar to that seen in alkaptonuria)   Azelaic acid cream, lotion or gel can be applied twice daily long term, and is safe in pregnancy  This may also sting   Kojic acid or kojic acid dipalmitate is often included in formulations, as it binds copper, required by L-DOPA (a cofactor of tyrosinase)  Kojic acid can cause irritant contact dermatitis and less commonly, allergic contact dermatitis   The mechanism of action of cysteamine cream is unclear, but is thought to involve inhibition of tyrosinase  A study of 50 patients with melasma found cysteamine cream to be significantly more effective than placebo cream     Ascorbic acid (vitamin C) also acts through copper to inhibit pigment production  It is well tolerated but highly unstable, so is usually combined with other agents   Methimazole (antithyroid drug) cream has been reported to reduce melanin synthesis and pigmentation in hydroquinone-resistant melasma   New agents under investigation include zinc sulfate mequinol, arbutin and deoxyarbutin (from berries), licorice extract, rucinol, resveratrol, 4-hydroxy-anisole, 2,5-dimethyl-4-hydroxy-3(2H)-furanone and/or N-acetyl glucosamine    Other active compounds used for melasma include:   Topical corticosteroids such as hydrocortisone  These work quickly to fade the colour and reduce the likelihood of contact dermatitis caused by other agents  Potent topical steroids are best avoided due to their potential to cause adverse effects   Soybean extract, which is thought to reduce the transfer of pigment from melanocytes to skin cells (keratinocytes) and to inhibit receptors   Tranexamic acid has been used experimentally for melasma as a cream or injected into the skin (mesotherapy), showing some benefit  It may cause allergy or irritation  Superficial or epidermal pigment can be peeled off   Peeling can also allow tyrosinase inhibitors to penetrate more effectively  These must be done carefully as peels may also induce post-inflammatory pigmentation   Topical alpha hydroxyacids including glycolic acid and lactic acid, as creams or as repeated superficial chemical peels, remove the surface skin and their low pH inhibits the activity of tyrosinase   Topical retinoids, such as tretinoin (a prescription medicine) are effective  Tretinoin can be hard to tolerate and sometimes causes contact dermatitis  Do not use during pregnancy   Salicylic acid, a common peeling ingredient in skin creams, can also be used for chemical peels, but it is not very effective in melasma  The most successful formulation has been a combination of hydroquinone, tretinoin, and moderate potency topical steroid  This has been found to result in improvement or clearance in up to 60-80% of those treated  Many other combinations of topical agents are in common use, as they are more effective than any one alone  However, these products are often expensive  Oral treatment of melasma  Oral medications for melasma are under investigation, including tranexamic acid  Tranexamic acid is a lysine analogue that inhibits plasmin and is usually used orally to stop bleeding  It reduces production of prostaglandins, the precursors of tyrosine  In low dose, tranexamic acid has been reported to be effective and safe in the treatment of melasma, providing patients have been carefully selected and are at low risk of thromboembolic disease  Glutathione is also under investigation as a systemic skin whitening agent, but has potentially serious adverse effects  Devices used to treat melasma  The ideal treatment for melasma would destroy the pigment, while leaving the cells alone  Unfortunately, this is hard to achieve  Machines can be used to remove epidermal pigmentation but with caution--over-treatment may cause postinflammatory pigmentation   Patients should be pretreated with a tyrosinase inhibitor (see above)  Fractional lasers, Q-switched Nd:YAG lasers and intense pulsed light (IPL) appear to be the most suitable options  Several treatments may be necessary and post-inflammatory hyperpigmentation may complicate recovery  Carbon dioxide or erbium:YAG resurfacing lasers, pigment lasers (Q-switched gary and Alexandrite devices) and mechanical dermabrasion and microdermabrasion should be used with caution in the treatment of melasma  What is the outcome of treatment of melasma? Results take time and the above measures are rarely completely successful  Unfortunately, even in those that get a good result from treatment, pigmentation may reappear on exposure to summer sun and/or because of hormonal factors  New topical and oral agents are being studied and offer hope for effective treatments in the future

## 2021-02-16 NOTE — PROGRESS NOTES
[facility] Follow Up Note    Patient Name: Gurwinder Coughlin  Encounter Date: 02/16/2021    Today's Chief Concerns:  Efren Chavez Concern #1:  Follow up melasma     Current Medications:    Current Outpatient Medications:     aspirin 325 mg tablet, Take 325 mg by mouth daily, Disp: , Rfl:     ibuprofen (MOTRIN) 600 mg tablet, Take 600 mg by mouth Three times daily as needed, Disp: , Rfl:     omeprazole (PriLOSEC) 40 MG capsule, Take 40 mg by mouth daily, Disp: , Rfl:     triamcinolone (KENALOG) 0 025 % cream, Apply topically daily, Disp: 80 g, Rfl: 1    Polypodium Leucotomos 240 MG CAPS, Take 2 capsules (480 mg total) by mouth daily (Patient not taking: Reported on 2/16/2021), Disp: 180 capsule, Rfl: 0    CONSTITUTIONAL:   Vitals:    02/16/21 1335   Temp: 98 5 °F (36 9 °C)   TempSrc: Tympanic   Weight: 86 2 kg (190 lb)   Height: 5' 3" (1 6 m)       Specific Alerts:    Have you been seen by a [facility]ologist in the last 3 years? YES    Are you pregnant or planning to become pregnant? No    Are you currently or planning to be nursing or breast feeding? No    Allergies   Allergen Reactions    Penicillin G Benzathine Shortness Of Breath     Other reaction(s): (PIMS) shortness of breath    Erythromycin Rash     Other reaction(s): (PIMS)       May we call your Preferred Phone number to discuss your specific medical information? YES    May we leave a detailed message that includes your specific medical information? YES    Have you traveled outside of the [facility] in the past 3 months? No    Do you currently have a pacemaker or defibrillator? No    Do you have any artificial heart valves, joints, plates, screws, rods, stents, pins, etc? No   - If Yes, were any placed within the last 2 years? Do you require any medications prior to a surgical procedure?  YES   - If Yes, for which procedure? dental   - If Yes, what medications to you require? antibiotic     Are you taking any medications that cause you to bleed more easily ("blood thinners") YES    Have you ever experienced a rapid heartbeat with epinephrine? No    Have you ever been treated with "gold" (gold sodium thiomalate) therapy? No    [facility] can help with wrinkles, "laugh lines," facial volume loss, "double chin," "love handles," age spots, and more  Are you interested in learning today about some of the skin enhancement procedures that we offer? (If Yes, please provide more detail) No    Review of Systems:  Have you recently had or currently have any of the following?     · Fever or chills: No  · Night Sweats: No  · Headaches: YES  · Weight Gain: No  · Weight Loss: No  · Blurry Vision: No  · Nausea: No  · Vomiting: No  · Diarrhea: No  · Blood in Stool: No  · Abdominal Pain: No  · Itchy Skin: No  · Painful Joints: YES  · Swollen Joints: No  · Muscle Pain: No  · Irregular Mole: No  · Sun Burn: No  · Dry Skin: No  · Skin Color Changes: No  · Scar or Keloid: No  · Cold Sores/Fever Blisters: No  · Bacterial Infections/MRSA: No  · Anxiety: No  · Depression: YES  · Suicidal or Homicidal Thoughts: No      PSYCH: Normal mood and affect  EYES: Normal conjunctiva  ENT: Normal lips and oral mucosa  CARDIOVASCULAR: No edema  RESPIRATORY: Normal respirations  HEME/LYMPH/IMMUNO:  No regional lymphadenopathy except as noted below in ASSESSMENT AND PLAN BY DIAGNOSIS    FULL ORGAN SYSTEM SKIN EXAM (SKIN)   Hair, Scalp, Ears, Face Normal except as noted below in Assessment   Neck, Cervical Chain Nodes Normal except as noted below in Assessment                                       1  MELASMA    Physical Exam:   Anatomic Location Affected:  Cheeks and forehead    Morphological Description:  Patchy light brown pigmentation    Pertinent Positives:   Pertinent Negatives:    Assessment and Plan:  Based on a thorough discussion of this condition and the management approach to it (including a comprehensive discussion of the known risks, side effects and potential benefits of treatment), the patient (family) agrees to implement the following specific plan:   When outside we recommend using a wide brim hat, sunglasses, long sleeve and pants, sunscreen with SPF 15+ with reapplication every 2 hours, or SPF specific clothing    Neutrogena Daily Defense SPF 50+ at least three times a day  [employer]  Brady Maya will send a compounding mediation, small amount to dark areas at night, 3 months on, 3 months off   Hydroquinone   8%Tretinoin   0 025%Kojic Acid   1%Niacinamide   4%Fluocinolone   0 025%Vehicle   CreamSize   60g   Follow up as needed or in 6 months      What is melasma? Melasma is a chronic skin disorder that results in symmetrical, blotchy, brownish facial pigmentation  It can lead to considerable embarrassment and distress  This form of facial pigmentation is sometimes called chloasma, but as this means green skin, the term melasma (brown skin) is preferred  Who gets melasma? Melasma is more common in women than in men; only 1-in-4 to 1-in-20 affected individuals are male, depending on the population studied  It generally starts between the age of 21 and 36 years, but it can begin in childhood or not until middle age  Melasma is more common in people that tan well or have naturally brown skin (Man skin types 3 and 4) compared with those who have fair skin (skin types 1 and 2) or black skin (skin types 5 or 6)  What causes melasma? The cause of melasma is complex  The pigmentation is due to overproduction of melanin by the pigment cells, melanocytes, which is taken up by the keratinocytes (epidermal melanosis) and/or deposited in the dermis (dermal melanosis, melanophages)  There is a genetic predisposition to melasma, with at least one-third of patients reporting other family members to be affected  In most people melasma is a chronic disorder    Known triggers for melasma include:   Sun exposure and sun damage--this is the most important avoidable risk factor    Pregnancy--in affected women, the pigment often fades a few months after delivery    Hormone treatments--oral contraceptive pills containing oestrogen and/or progesterone, hormone replacement, intrauterine devices and implants are a factor in about a quarter of affected women    Certain medications (including new targeted therapies for cancer), scented or deodorant soaps, toiletries and cosmetics--these may cause a phototoxic reaction that triggers melasma, which may then persist long term    Hypothyroidism (low levels of circulating thyroid hormone)    Melasma commonly arises in healthy, non-pregnant adults  Lifelong sun exposure causes deposition of pigment within the dermis and this often persists longterm  Exposure to ultraviolet radiation (UVR) deepens the pigmentation because it activates the melanocytes to produce more melanin  Research is attempting to pinpoint the roles of stem cell, neural, vascular and local hormonal factors in promoting melanocyte activation  What are the clinical features of melasma? Melasma presents as macules (freckle-like spots) and larger flat brown patches  These are found on both sides of the face and have an irregular border  There are several distinct patterns   Centrofacial pattern: forehead, cheeks, nose and upper lips    Malar pattern: cheeks and nose    Lateral cheek pattern    Mandibular pattern: jawline    Reddened or inflamed forms of melasma (also called erythrosis pigmentosa faciei)    Poikiloderma of [place]: reddened, photoaging changes seen on the sides of the neck, mostly affecting patients older than 50 years    Brachial type of melasma affecting shoulders and upper arms (also called acquired brachial cutaneous dyschromatosis)  Melasma is sometimes  into epidermal (skin surface), dermal (deeper) and mixed types   A Wood lamp that emits black light (UVA1) may be used to identify the depth of the pigment  Some general classifications include the following:    Epidermal melasma   Well-defined border    Dark brown colour    Appears more obvious under black light    Responds well to treatment    Dermal melasma   Ill-defined border    Light brown or bluish in colour    Unchanged under black light    Responds poorly to treatment    Mixed melasma   The most common type    Combination of bluish, light and dark brown patches    Mixed pattern seen under black light    Partial improvement with treatment    What is the treatment of melasma? Melasma can be very slow to respond to treatment, especially if it has been present for a long time  Treatment may result in irritant contact dermatitis in patients with sensitive skin, and this can result in post-inflammatory pigmentation  Generally a combination of the following measures is helpful  General measures   Discontinue hormonal contraception   Year-round life-long sun protection  Wear a broad-brimmed hat   Use broad-spectrum very high protection factor (SPF 50+) sunscreen applied to the whole face daily, year-round  It should be reapplied every 2 hours if outdoors during the summer months  Sunscreens containing iron oxides are preferred, as they screen out some visible light as well as ultraviolet radiation  Alternatively or as well, use a make-up that contains sunscreen   Use a mild cleanser, and if the skin is dry, a light moisturiser   Cosmetic camouflage (make-up) is invaluable to disguise the pigment  Topical therapy  Tyrosinase inhibitors are the mainstay of treatment  The aim is to prevent new pigment formation by inhibiting formation of melanin by the melanocytes   Hydroquinone 2-4% as cream or lotion, applied accurately to pigmented areas at night for 2-4 months  This may cause contact dermatitis (stinging and redness) in 25% of patients   It should not be used in higher concentration or for prolonged courses as it has been associated with ochronosis (a bluish grey discolouration similar to that seen in alkaptonuria)   Azelaic acid cream, lotion or gel can be applied twice daily long term, and is safe in pregnancy  This may also sting   Kojic acid or kojic acid dipalmitate is often included in formulations, as it binds copper, required by L-DOPA (a cofactor of tyrosinase)  Kojic acid can cause irritant contact dermatitis and less commonly, allergic contact dermatitis   The mechanism of action of cysteamine cream is unclear, but is thought to involve inhibition of tyrosinase  A study of 50 patients with melasma found cysteamine cream to be significantly more effective than placebo cream     Ascorbic acid (vitamin C) also acts through copper to inhibit pigment production  It is well tolerated but highly unstable, so is usually combined with other agents   Methimazole (antithyroid drug) cream has been reported to reduce melanin synthesis and pigmentation in hydroquinone-resistant melasma   New agents under investigation include zinc sulfate mequinol, arbutin and deoxyarbutin (from berries), licorice extract, rucinol, resveratrol, 4-hydroxy-anisole, 2,5-dimethyl-4-hydroxy-3(2H)-furanone and/or N-acetyl glucosamine    Other active compounds used for melasma include:   Topical corticosteroids such as hydrocortisone  These work quickly to fade the colour and reduce the likelihood of contact dermatitis caused by other agents  Potent topical steroids are best avoided due to their potential to cause adverse effects   Soybean extract, which is thought to reduce the transfer of pigment from melanocytes to skin cells (keratinocytes) and to inhibit receptors   Tranexamic acid has been used experimentally for melasma as a cream or injected into the skin (mesotherapy), showing some benefit  It may cause allergy or irritation  Superficial or epidermal pigment can be peeled off   Peeling can also allow tyrosinase inhibitors to penetrate more effectively  These must be done carefully as peels may also induce post-inflammatory pigmentation   Topical alpha hydroxyacids including glycolic acid and lactic acid, as creams or as repeated superficial chemical peels, remove the surface skin and their low pH inhibits the activity of tyrosinase   Topical retinoids, such as tretinoin (a prescription medicine) are effective  Tretinoin can be hard to tolerate and sometimes causes contact dermatitis  Do not use during pregnancy   Salicylic acid, a common peeling ingredient in skin creams, can also be used for chemical peels, but it is not very effective in melasma  The most successful formulation has been a combination of hydroquinone, tretinoin, and moderate potency topical steroid  This has been found to result in improvement or clearance in up to 60-80% of those treated  Many other combinations of topical agents are in common use, as they are more effective than any one alone  However, these products are often expensive  Oral treatment of melasma  Oral medications for melasma are under investigation, including tranexamic acid  Tranexamic acid is a lysine analogue that inhibits plasmin and is usually used orally to stop bleeding  It reduces production of prostaglandins, the precursors of tyrosine  In low dose, tranexamic acid has been reported to be effective and safe in the treatment of melasma, providing patients have been carefully selected and are at low risk of thromboembolic disease  Glutathione is also under investigation as a systemic skin whitening agent, but has potentially serious adverse effects  Devices used to treat melasma  The ideal treatment for melasma would destroy the pigment, while leaving the cells alone  Unfortunately, this is hard to achieve  Machines can be used to remove epidermal pigmentation but with caution--over-treatment may cause postinflammatory pigmentation   Patients should be pretreated with a tyrosinase inhibitor (see above)  Fractional lasers, Q-switched Nd:YAG lasers and intense pulsed light (IPL) appear to be the most suitable options  Several treatments may be necessary and post-inflammatory hyperpigmentation may complicate recovery  Carbon dioxide or erbium:YAG resurfacing lasers, pigment lasers (Q-switched gary and Alexandrite devices) and mechanical dermabrasion and microdermabrasion should be used with caution in the treatment of melasma  What is the outcome of treatment of melasma? Results take time and the above measures are rarely completely successful  Unfortunately, even in those that get a good result from treatment, pigmentation may reappear on exposure to summer sun and/or because of hormonal factors  New topical and oral agents are being studied and offer hope for effective treatments in the future      Scribe Attestation    I,:  Casandra Toro MA am acting as a scribe while in the presence of the attending physician :       I,:  Teresita Faria MD personally performed the services described in this documentation    as scribed in my presence :

## 2021-07-27 ENCOUNTER — TELEPHONE (OUTPATIENT)
Dept: DERMATOLOGY | Facility: CLINIC | Age: 51
End: 2021-07-27

## 2021-07-27 DIAGNOSIS — L81.1 MELASMA: ICD-10-CM

## 2021-07-27 RX ORDER — TRIAMCINOLONE ACETONIDE 0.25 MG/G
CREAM TOPICAL DAILY
Qty: 80 G | Refills: 1 | Status: SHIPPED | OUTPATIENT
Start: 2021-07-27

## 2021-07-27 NOTE — TELEPHONE ENCOUNTER
Pt called to schedule f/u, I scheduled f/u with Dr Lizet Martell on 8/10 at 1145am  Pt also asked if we can resend prescription for kenalog to pharmacy now that she can afford to pay for it, please advise and contact patient, I did advise the pt, you may want to see her first but you would be the person to make that decision  Nancie Kendall jd

## 2021-08-10 ENCOUNTER — OFFICE VISIT (OUTPATIENT)
Dept: DERMATOLOGY | Facility: CLINIC | Age: 51
End: 2021-08-10
Payer: COMMERCIAL

## 2021-08-10 VITALS — TEMPERATURE: 97.8 F | BODY MASS INDEX: 31.89 KG/M2 | HEIGHT: 63 IN | WEIGHT: 180 LBS

## 2021-08-10 DIAGNOSIS — L81.1 MELASMA: Primary | ICD-10-CM

## 2021-08-10 PROCEDURE — 99213 OFFICE O/P EST LOW 20 MIN: CPT | Performed by: DERMATOLOGY

## 2021-08-10 NOTE — PROGRESS NOTES
[facility] Follow Up Note    Patient Name: Esther Pena  Encounter Date: 08/10/2021    Today's Chief Concerns:  Gabi Goss Concern #1:  Melasma follow up     Current Medications:    Current Outpatient Medications:     aspirin 325 mg tablet, Take 325 mg by mouth daily, Disp: , Rfl:     ibuprofen (MOTRIN) 600 mg tablet, Take 600 mg by mouth Three times daily as needed, Disp: , Rfl:     omeprazole (PriLOSEC) 40 MG capsule, Take 40 mg by mouth daily, Disp: , Rfl:     triamcinolone (KENALOG) 0 025 % cream, Apply topically daily, Disp: 80 g, Rfl: 1    CONSTITUTIONAL:   Vitals:    08/10/21 1210   Temp: 97 8 °F (36 6 °C)   TempSrc: Tympanic   Weight: 81 6 kg (180 lb)   Height: 5' 3" (1 6 m)         Specific Alerts:    Have you been seen by a [facility] Dermatologist in the last 3 years? YES    Are you pregnant or planning to become pregnant? No    Are you currently or planning to be nursing or breast feeding? No    Allergies   Allergen Reactions    Penicillin G Benzathine Shortness Of Breath     Other reaction(s): (PIMS) shortness of breath    Erythromycin Rash     Other reaction(s): (PIMS)       May we call your Preferred Phone number to discuss your specific medical information? YES    May we leave a detailed message that includes your specific medical information? YES    Have you traveled outside of the [facility] in the past 3 months? No    Do you currently have a pacemaker or defibrillator? No    Do you have any artificial heart valves, joints, plates, screws, rods, stents, pins, etc? No   - If Yes, were any placed within the last 2 years? Do you require any medications prior to a surgical procedure? YES   - If Yes, for which procedure? dental   - If Yes, what medications to you require? Are you taking any medications that cause you to bleed more easily ("blood thinners") YES    Have you ever experienced a rapid heartbeat with epinephrine?  No    Have you ever been treated with "gold" (gold sodium thiomalate) therapy? No    [facility] can help with wrinkles, "laugh lines," facial volume loss, "double chin," "love handles," age spots, and more  Are you interested in learning today about some of the skin enhancement procedures that we offer? (If Yes, please provide more detail) No    Review of Systems:  Have you recently had or currently have any of the following? · Fever or chills: No  · Night Sweats: No  · Headaches: No  · Weight Gain: No  · Weight Loss: No  · Blurry Vision: No  · Nausea: No  · Vomiting: No  · Diarrhea: No  · Blood in Stool: No  · Abdominal Pain: No  · Itchy Skin: YES  · Painful Joints: No  · Swollen Joints: No  · Muscle Pain: No  · Irregular Mole: No  · Sun Burn: No  · Dry Skin: No  · Skin Color Changes: No  · Scar or Keloid: No  · Cold Sores/Fever Blisters: No  · Bacterial Infections/MRSA: No  · Anxiety: No  · Depression: No  · Suicidal or Homicidal Thoughts: No      PSYCH: Normal mood and affect  EYES: Normal conjunctiva  ENT: Normal lips and oral mucosa  CARDIOVASCULAR: No edema  RESPIRATORY: Normal respirations  HEME/LYMPH/IMMUNO:  No regional lymphadenopathy except as noted below in ASSESSMENT AND PLAN BY DIAGNOSIS    FULL ORGAN SYSTEM SKIN EXAM (SKIN)   Hair, Scalp, Ears, Face Normal except as noted below in Assessment       1   FOLLOW-UP MELASMA    Physical Exam:   Anatomic Location Affected:  Forehead, cheeks, and chin   Morphological Description:  Brown reticulated patches     Patient received email from [facility]- however, forgot to register and obtain medication so has not been using anything but sunscreen    Assessment and Plan:  Based on a thorough discussion of this condition and the management approach to it (including a comprehensive discussion of the known risks, side effects and potential benefits of treatment), the patient (family) agrees to implement the following specific plan:   When outside we recommend using a wide brim hat, sunglasses, long sleeve and pants, sunscreen with SPF 40+ with reapplication every 2 hours, or SPF specific clothing    Neutrogena Daily Defense SPF 50+ at least three times a day   Assisted patient in setting up [facility] account and verified that the prescription from last time was still valid and could be obtained:  o Hydroquinone 8 %   o Tretinoin 0 025%   o Kojic Acid 1%   o Niacinamide 4%   o Fluocinolone 0 025%   o Vehicle Cream   o Size 60g   Apply a small amount to dark areas at night 3 months on 3 months off    Can obtain over the counter 5 % Tranexamic acid- can use in the 3 month off period; apply topically once daily at night to brown spots on face   Follow up in 6 months     What is melasma? Melasma is a chronic skin disorder that results in symmetrical, blotchy, brownish facial pigmentation  It can lead to considerable embarrassment and distress  This form of facial pigmentation is sometimes called chloasma, but as this means green skin, the term melasma (brown skin) is preferred  Who gets melasma? Melasma is more common in women than in men; only 1-in-4 to 1-in-20 affected individuals are male, depending on the population studied  It generally starts between the age of 21 and 36 years, but it can begin in childhood or not until middle age  Melasma is more common in people that tan well or have naturally brown skin (Man skin types 3 and 4) compared with those who have fair skin (skin types 1 and 2) or black skin (skin types 5 or 6)  What causes melasma? The cause of melasma is complex  The pigmentation is due to overproduction of melanin by the pigment cells, melanocytes, which is taken up by the keratinocytes (epidermal melanosis) and/or deposited in the dermis (dermal melanosis, melanophages)  There is a genetic predisposition to melasma, with at least one-third of patients reporting other family members to be affected   In most people melasma is a chronic disorder  Known triggers for melasma include:   Sun exposure and sun damage--this is the most important avoidable risk factor    Pregnancy--in affected women, the pigment often fades a few months after delivery    Hormone treatments--oral contraceptive pills containing oestrogen and/or progesterone, hormone replacement, intrauterine devices and implants are a factor in about a quarter of affected women    Certain medications (including new targeted therapies for cancer), scented or deodorant soaps, toiletries and cosmetics--these may cause a phototoxic reaction that triggers melasma, which may then persist long term    Hypothyroidism (low levels of circulating thyroid hormone)    Melasma commonly arises in healthy, non-pregnant adults  Lifelong sun exposure causes deposition of pigment within the dermis and this often persists longterm  Exposure to ultraviolet radiation (UVR) deepens the pigmentation because it activates the melanocytes to produce more melanin  Research is attempting to pinpoint the roles of stem cell, neural, vascular and local hormonal factors in promoting melanocyte activation  What are the clinical features of melasma? Melasma presents as macules (freckle-like spots) and larger flat brown patches  These are found on both sides of the face and have an irregular border  There are several distinct patterns   Centrofacial pattern: forehead, cheeks, nose and upper lips    Malar pattern: cheeks and nose    Lateral cheek pattern    Mandibular pattern: jawline    Reddened or inflamed forms of melasma (also called erythrosis pigmentosa faciei)    Poikiloderma of [place]: reddened, photoaging changes seen on the sides of the neck, mostly affecting patients older than 50 years    Brachial type of melasma affecting shoulders and upper arms (also called acquired brachial cutaneous dyschromatosis)      Melasma is sometimes  into epidermal (skin surface), dermal (deeper) and mixed types  A Wood lamp that emits black light (UVA1) may be used to identify the depth of the pigment  Some general classifications include the following:    Epidermal melasma   Well-defined border    Dark brown colour    Appears more obvious under black light    Responds well to treatment    Dermal melasma   Ill-defined border    Light brown or bluish in colour    Unchanged under black light    Responds poorly to treatment    Mixed melasma   The most common type    Combination of bluish, light and dark brown patches    Mixed pattern seen under black light    Partial improvement with treatment    What is the treatment of melasma? Melasma can be very slow to respond to treatment, especially if it has been present for a long time  Treatment may result in irritant contact dermatitis in patients with sensitive skin, and this can result in post-inflammatory pigmentation  Generally a combination of the following measures is helpful  General measures   Discontinue hormonal contraception   Year-round life-long sun protection  Wear a broad-brimmed hat   Use broad-spectrum very high protection factor (SPF 50+) sunscreen applied to the whole face daily, year-round  It should be reapplied every 2 hours if outdoors during the summer months  Sunscreens containing iron oxides are preferred, as they screen out some visible light as well as ultraviolet radiation  Alternatively or as well, use a make-up that contains sunscreen   Use a mild cleanser, and if the skin is dry, a light moisturiser   Cosmetic camouflage (make-up) is invaluable to disguise the pigment  Topical therapy  Tyrosinase inhibitors are the mainstay of treatment  The aim is to prevent new pigment formation by inhibiting formation of melanin by the melanocytes   Hydroquinone 2-4% as cream or lotion, applied accurately to pigmented areas at night for 2-4 months   This may cause contact dermatitis (stinging and redness) in 25% of patients  It should not be used in higher concentration or for prolonged courses as it has been associated with ochronosis (a bluish grey discolouration similar to that seen in alkaptonuria)   Azelaic acid cream, lotion or gel can be applied twice daily long term, and is safe in pregnancy  This may also sting   Kojic acid or kojic acid dipalmitate is often included in formulations, as it binds copper, required by L-DOPA (a cofactor of tyrosinase)  Kojic acid can cause irritant contact dermatitis and less commonly, allergic contact dermatitis   The mechanism of action of cysteamine cream is unclear, but is thought to involve inhibition of tyrosinase  A study of 50 patients with melasma found cysteamine cream to be significantly more effective than placebo cream     Ascorbic acid (vitamin C) also acts through copper to inhibit pigment production  It is well tolerated but highly unstable, so is usually combined with other agents   Methimazole (antithyroid drug) cream has been reported to reduce melanin synthesis and pigmentation in hydroquinone-resistant melasma   New agents under investigation include zinc sulfate mequinol, arbutin and deoxyarbutin (from berries), licorice extract, rucinol, resveratrol, 4-hydroxy-anisole, 2,5-dimethyl-4-hydroxy-3(2H)-furanone and/or N-acetyl glucosamine    Other active compounds used for melasma include:   Topical corticosteroids such as hydrocortisone  These work quickly to fade the colour and reduce the likelihood of contact dermatitis caused by other agents  Potent topical steroids are best avoided due to their potential to cause adverse effects   Soybean extract, which is thought to reduce the transfer of pigment from melanocytes to skin cells (keratinocytes) and to inhibit receptors   Tranexamic acid has been used experimentally for melasma as a cream or injected into the skin (mesotherapy), showing some benefit   It may cause allergy or irritation  Superficial or epidermal pigment can be peeled off  Peeling can also allow tyrosinase inhibitors to penetrate more effectively  These must be done carefully as peels may also induce post-inflammatory pigmentation   Topical alpha hydroxyacids including glycolic acid and lactic acid, as creams or as repeated superficial chemical peels, remove the surface skin and their low pH inhibits the activity of tyrosinase   Topical retinoids, such as tretinoin (a prescription medicine) are effective  Tretinoin can be hard to tolerate and sometimes causes contact dermatitis  Do not use during pregnancy   Salicylic acid, a common peeling ingredient in skin creams, can also be used for chemical peels, but it is not very effective in melasma  The most successful formulation has been a combination of hydroquinone, tretinoin, and moderate potency topical steroid  This has been found to result in improvement or clearance in up to 60-80% of those treated  Many other combinations of topical agents are in common use, as they are more effective than any one alone  However, these products are often expensive  Oral treatment of melasma  Oral medications for melasma are under investigation, including tranexamic acid  Tranexamic acid is a lysine analogue that inhibits plasmin and is usually used orally to stop bleeding  It reduces production of prostaglandins, the precursors of tyrosine  In low dose, tranexamic acid has been reported to be effective and safe in the treatment of melasma, providing patients have been carefully selected and are at low risk of thromboembolic disease  Glutathione is also under investigation as a systemic skin whitening agent, but has potentially serious adverse effects  Devices used to treat melasma  The ideal treatment for melasma would destroy the pigment, while leaving the cells alone  Unfortunately, this is hard to achieve   Machines can be used to remove epidermal pigmentation but with caution--over-treatment may cause postinflammatory pigmentation  Patients should be pretreated with a tyrosinase inhibitor (see above)  Fractional lasers, Q-switched Nd:YAG lasers and intense pulsed light (IPL) appear to be the most suitable options  Several treatments may be necessary and post-inflammatory hyperpigmentation may complicate recovery  Carbon dioxide or erbium:YAG resurfacing lasers, pigment lasers (Q-switched gary and Alexandrite devices) and mechanical dermabrasion and microdermabrasion should be used with caution in the treatment of melasma  What is the outcome of treatment of melasma? Results take time and the above measures are rarely completely successful  Unfortunately, even in those that get a good result from treatment, pigmentation may reappear on exposure to summer sun and/or because of hormonal factors  New topical and oral agents are being studied and offer hope for effective treatments in the future  Scribe Attestation    I,:  Álvaro Duckworth MA am acting as a scribe while in the presence of the attending physician :       I,:  Maynor Payne MD personally performed the services described in this documentation    as scribed in my presence :         The patient was seen and discussed with Dr Stu Mi       RTC:  6 months for melasma follow-up    Maynor Payne  Dermatology PGY-3 Resident Physician

## 2021-08-10 NOTE — PATIENT INSTRUCTIONS
Assessment and Plan:  Based on a thorough discussion of this condition and the management approach to it (including a comprehensive discussion of the known risks, side effects and potential benefits of treatment), the patient (family) agrees to implement the following specific plan:   When outside we recommend using a wide brim hat, sunglasses, long sleeve and pants, sunscreen with SPF 01+ with reapplication every 2 hours, or SPF specific clothing    Neutrogena Daily Defense SPF 50+ at least three times a day  [employer]  [facility]   Hydroquinone   8 % Tretinoin    0 025% Kojic Acid   1% Niacinamide   4% Fluocinolone   0 025% Vehicle    Cream Size    60g   Small amount to dark areas at night 3 months on 3 months off    [facility]    Over the counter 5 % Tranexamic acid can use in the 3 month off period    Follow up in 6 months     What is melasma? Melasma is a chronic skin disorder that results in symmetrical, blotchy, brownish facial pigmentation  It can lead to considerable embarrassment and distress  This form of facial pigmentation is sometimes called chloasma, but as this means green skin, the term melasma (brown skin) is preferred  Who gets melasma? Melasma is more common in women than in men; only 1-in-4 to 1-in-20 affected individuals are male, depending on the population studied  It generally starts between the age of 21 and 36 years, but it can begin in childhood or not until middle age  Melasma is more common in people that tan well or have naturally brown skin (Man skin types 3 and 4) compared with those who have fair skin (skin types 1 and 2) or black skin (skin types 5 or 6)  What causes melasma? The cause of melasma is complex  The pigmentation is due to overproduction of melanin by the pigment cells, melanocytes, which is taken up by the keratinocytes (epidermal melanosis) and/or deposited in the dermis (dermal melanosis, melanophages)   There is a genetic predisposition to melasma, with at least one-third of patients reporting other family members to be affected  In most people melasma is a chronic disorder  Known triggers for melasma include:   Sun exposure and sun damage--this is the most important avoidable risk factor    Pregnancy--in affected women, the pigment often fades a few months after delivery    Hormone treatments--oral contraceptive pills containing oestrogen and/or progesterone, hormone replacement, intrauterine devices and implants are a factor in about a quarter of affected women    Certain medications (including new targeted therapies for cancer), scented or deodorant soaps, toiletries and cosmetics--these may cause a phototoxic reaction that triggers melasma, which may then persist long term    Hypothyroidism (low levels of circulating thyroid hormone)    Melasma commonly arises in healthy, non-pregnant adults  Lifelong sun exposure causes deposition of pigment within the dermis and this often persists longterm  Exposure to ultraviolet radiation (UVR) deepens the pigmentation because it activates the melanocytes to produce more melanin  Research is attempting to pinpoint the roles of stem cell, neural, vascular and local hormonal factors in promoting melanocyte activation  What are the clinical features of melasma? Melasma presents as macules (freckle-like spots) and larger flat brown patches  These are found on both sides of the face and have an irregular border  There are several distinct patterns     Centrofacial pattern: forehead, cheeks, nose and upper lips    Malar pattern: cheeks and nose    Lateral cheek pattern    Mandibular pattern: jawline    Reddened or inflamed forms of melasma (also called erythrosis pigmentosa faciei)    Poikiloderma of [place]: reddened, photoaging changes seen on the sides of the neck, mostly affecting patients older than 50 years    Brachial type of melasma affecting shoulders and upper arms (also called acquired brachial cutaneous dyschromatosis)  Melasma is sometimes  into epidermal (skin surface), dermal (deeper) and mixed types  A Wood lamp that emits black light (UVA1) may be used to identify the depth of the pigment  Some general classifications include the following:    Epidermal melasma   Well-defined border    Dark brown colour    Appears more obvious under black light    Responds well to treatment    Dermal melasma   Ill-defined border    Light brown or bluish in colour    Unchanged under black light    Responds poorly to treatment    Mixed melasma   The most common type    Combination of bluish, light and dark brown patches    Mixed pattern seen under black light    Partial improvement with treatment    What is the treatment of melasma? Melasma can be very slow to respond to treatment, especially if it has been present for a long time  Treatment may result in irritant contact dermatitis in patients with sensitive skin, and this can result in post-inflammatory pigmentation  Generally a combination of the following measures is helpful  General measures   Discontinue hormonal contraception   Year-round life-long sun protection  Wear a broad-brimmed hat   Use broad-spectrum very high protection factor (SPF 50+) sunscreen applied to the whole face daily, year-round  It should be reapplied every 2 hours if outdoors during the summer months  Sunscreens containing iron oxides are preferred, as they screen out some visible light as well as ultraviolet radiation  Alternatively or as well, use a make-up that contains sunscreen   Use a mild cleanser, and if the skin is dry, a light moisturiser   Cosmetic camouflage (make-up) is invaluable to disguise the pigment  Topical therapy  Tyrosinase inhibitors are the mainstay of treatment  The aim is to prevent new pigment formation by inhibiting formation of melanin by the melanocytes     Hydroquinone 2-4% as cream or lotion, applied accurately to pigmented areas at night for 2-4 months  This may cause contact dermatitis (stinging and redness) in 25% of patients  It should not be used in higher concentration or for prolonged courses as it has been associated with ochronosis (a bluish grey discolouration similar to that seen in alkaptonuria)   Azelaic acid cream, lotion or gel can be applied twice daily long term, and is safe in pregnancy  This may also sting   Kojic acid or kojic acid dipalmitate is often included in formulations, as it binds copper, required by L-DOPA (a cofactor of tyrosinase)  Kojic acid can cause irritant contact dermatitis and less commonly, allergic contact dermatitis   The mechanism of action of cysteamine cream is unclear, but is thought to involve inhibition of tyrosinase  A study of 50 patients with melasma found cysteamine cream to be significantly more effective than placebo cream     Ascorbic acid (vitamin C) also acts through copper to inhibit pigment production  It is well tolerated but highly unstable, so is usually combined with other agents   Methimazole (antithyroid drug) cream has been reported to reduce melanin synthesis and pigmentation in hydroquinone-resistant melasma   New agents under investigation include zinc sulfate mequinol, arbutin and deoxyarbutin (from berries), licorice extract, rucinol, resveratrol, 4-hydroxy-anisole, 2,5-dimethyl-4-hydroxy-3(2H)-furanone and/or N-acetyl glucosamine    Other active compounds used for melasma include:   Topical corticosteroids such as hydrocortisone  These work quickly to fade the colour and reduce the likelihood of contact dermatitis caused by other agents  Potent topical steroids are best avoided due to their potential to cause adverse effects   Soybean extract, which is thought to reduce the transfer of pigment from melanocytes to skin cells (keratinocytes) and to inhibit receptors      Tranexamic acid has been used experimentally for melasma as a cream or injected into the skin (mesotherapy), showing some benefit  It may cause allergy or irritation  Superficial or epidermal pigment can be peeled off  Peeling can also allow tyrosinase inhibitors to penetrate more effectively  These must be done carefully as peels may also induce post-inflammatory pigmentation   Topical alpha hydroxyacids including glycolic acid and lactic acid, as creams or as repeated superficial chemical peels, remove the surface skin and their low pH inhibits the activity of tyrosinase   Topical retinoids, such as tretinoin (a prescription medicine) are effective  Tretinoin can be hard to tolerate and sometimes causes contact dermatitis  Do not use during pregnancy   Salicylic acid, a common peeling ingredient in skin creams, can also be used for chemical peels, but it is not very effective in melasma  The most successful formulation has been a combination of hydroquinone, tretinoin, and moderate potency topical steroid  This has been found to result in improvement or clearance in up to 60-80% of those treated  Many other combinations of topical agents are in common use, as they are more effective than any one alone  However, these products are often expensive  Oral treatment of melasma  Oral medications for melasma are under investigation, including tranexamic acid  Tranexamic acid is a lysine analogue that inhibits plasmin and is usually used orally to stop bleeding  It reduces production of prostaglandins, the precursors of tyrosine  In low dose, tranexamic acid has been reported to be effective and safe in the treatment of melasma, providing patients have been carefully selected and are at low risk of thromboembolic disease  Glutathione is also under investigation as a systemic skin whitening agent, but has potentially serious adverse effects      Devices used to treat melasma  The ideal treatment for melasma would destroy the pigment, while leaving the cells alone  Unfortunately, this is hard to achieve  Machines can be used to remove epidermal pigmentation but with caution--over-treatment may cause postinflammatory pigmentation  Patients should be pretreated with a tyrosinase inhibitor (see above)  Fractional lasers, Q-switched Nd:YAG lasers and intense pulsed light (IPL) appear to be the most suitable options  Several treatments may be necessary and post-inflammatory hyperpigmentation may complicate recovery  Carbon dioxide or erbium:YAG resurfacing lasers, pigment lasers (Q-switched gary and Alexandrite devices) and mechanical dermabrasion and microdermabrasion should be used with caution in the treatment of melasma  What is the outcome of treatment of melasma? Results take time and the above measures are rarely completely successful  Unfortunately, even in those that get a good result from treatment, pigmentation may reappear on exposure to summer sun and/or because of hormonal factors  New topical and oral agents are being studied and offer hope for effective treatments in the future

## 2023-06-21 ENCOUNTER — OFFICE VISIT (OUTPATIENT)
Dept: DERMATOLOGY | Facility: CLINIC | Age: 53
End: 2023-06-21
Payer: COMMERCIAL

## 2023-06-21 VITALS — TEMPERATURE: 97.3 F | BODY MASS INDEX: 35.08 KG/M2 | HEIGHT: 63 IN | WEIGHT: 198 LBS

## 2023-06-21 DIAGNOSIS — L81.1 MELASMA: Primary | ICD-10-CM

## 2023-06-21 DIAGNOSIS — L82.1 SEBORRHEIC KERATOSIS: ICD-10-CM

## 2023-06-21 PROCEDURE — 99213 OFFICE O/P EST LOW 20 MIN: CPT | Performed by: DERMATOLOGY

## 2023-06-21 RX ORDER — FLUTICASONE PROPIONATE 50 MCG
SPRAY, SUSPENSION (ML) NASAL
COMMUNITY
Start: 2023-05-15

## 2023-06-21 RX ORDER — ATORVASTATIN CALCIUM 20 MG/1
TABLET, FILM COATED ORAL
COMMUNITY
Start: 2023-06-15

## 2023-06-21 RX ORDER — ALBUTEROL SULFATE 90 UG/1
2 AEROSOL, METERED RESPIRATORY (INHALATION) EVERY 6 HOURS PRN
COMMUNITY
Start: 2023-03-28

## 2023-06-21 NOTE — PROGRESS NOTES
"[facility] Note     Patient Name: Carolynn Tavera  Encounter Date: 6/21/2023     Have you been cared for by a [place] [id number] [facility]atologist in the last 3 years and, if so, which description applies to you? Yes  I have been here within the last 3 years, and my medical history has NOT changed since that time  I am FEMALE/of child-bearing potential     REVIEW OF SYSTEMS:  Have you recently had or currently have any of the following? · No changes in my recent health  PAST MEDICAL HISTORY:  Have you personally ever had or currently have any of the following? If \"YES,\" then please provide more detail  · No changes in my medical history  FAMILY HISTORY:  Any \"first degree relatives\" (parent, brother, sister, or child) with the following? • No changes in my family's known health  PATIENT EXPERIENCE:    • Do you want the Dermatologist to perform a COMPLETE skin exam today including a clinical examination under the \"bra and underwear\" areas? NO  • If necessary, do we have your permission to call and leave a detailed message on your Preferred Phone number that includes your specific medical information?   Yes      Allergies   Allergen Reactions   • Penicillin G Benzathine Shortness Of Breath     Other reaction(s): (PIMS) shortness of breath   • Erythromycin Rash     Other reaction(s): (PIMS)      Current Outpatient Medications:   •  aspirin 325 mg tablet, Take 325 mg by mouth daily, Disp: , Rfl:   •  ibuprofen (MOTRIN) 600 mg tablet, Take 600 mg by mouth Three times daily as needed, Disp: , Rfl:   •  omeprazole (PriLOSEC) 40 MG capsule, Take 40 mg by mouth daily, Disp: , Rfl:   •  triamcinolone (KENALOG) 0 025 % cream, Apply topically daily, Disp: 80 g, Rfl: 1          • Whom besides the patient is providing clinical information about today's encounter?   o NO ADDITIONAL HISTORIAN (patient alone provided history)    Physical Exam and Assessment/Plan by Diagnosis:    FOLLOW UP MELASMA    Physical " Exam:  • Anatomic Location Affected:  Cheeks and forehead  • Morphological Description:  Light brown patches   • Pertinent Positives:  • Pertinent Negatives: Additional History of Present Condition:  Last seen 8/10/2021  Patient reports using compounding cream from [facility] for 3 months on and three months off and that it has been helpful  Patient requires a refill  Assessment and Plan:  Based on a thorough discussion of this condition and the management approach to it (including a comprehensive discussion of the known risks, side effects and potential benefits of treatment), the patient (family) agrees to implement the following specific plan:  • [facility] Topical Compounding medication will be refilled today  Use three months straight and then take 3 month break before re starting  E mail was verified  ? Hydroquinone 8 %   ? Tretinoin 0 025%   ? Kojic Acid 1%   ? Niacinamide 4%   ? Fluocinolone 0 025%   ? Vehicle Cream   ? Size 60g  • Neutrogena Tinted Sunscreen at least three times a day    What is melasma? Melasma is a chronic skin disorder that results in symmetrical, blotchy, brownish facial pigmentation  It can lead to considerable embarrassment and distress  This form of facial pigmentation is sometimes called chloasma, but as this means green skin, the term melasma (brown skin) is preferred  Who gets melasma? Melasma is more common in women than in men; only 1-in-4 to 1-in-20 affected individuals are male, depending on the population studied  It generally starts between the age of 21 and 36 years, but it can begin in childhood or not until middle age  Melasma is more common in people that tan well or have naturally brown skin (Man skin types 3 and 4) compared with those who have fair skin (skin types 1 and 2) or black skin (skin types 5 or 6)  What causes melasma? The cause of melasma is complex   The pigmentation is due to overproduction of melanin by the pigment cells, melanocytes, which is taken up by the keratinocytes (epidermal melanosis) and/or deposited in the dermis (dermal melanosis, melanophages)  There is a genetic predisposition to melasma, with at least one-third of patients reporting other family members to be affected  In most people melasma is a chronic disorder  Known triggers for melasma include:  • Sun exposure and sun damage--this is the most important avoidable risk factor   • Pregnancy--in affected women, the pigment often fades a few months after delivery   • Hormone treatments--oral contraceptive pills containing oestrogen and/or progesterone, hormone replacement, intrauterine devices and implants are a factor in about a quarter of affected women   • Certain medications (including new targeted therapies for cancer), scented or deodorant soaps, toiletries and cosmetics--these may cause a phototoxic reaction that triggers melasma, which may then persist long term   • Hypothyroidism (low levels of circulating thyroid hormone)    Melasma commonly arises in healthy, non-pregnant adults  Lifelong sun exposure causes deposition of pigment within the dermis and this often persists longterm  Exposure to ultraviolet radiation (UVR) deepens the pigmentation because it activates the melanocytes to produce more melanin  Research is attempting to pinpoint the roles of stem cell, neural, vascular and local hormonal factors in promoting melanocyte activation  What are the clinical features of melasma? Melasma presents as macules (freckle-like spots) and larger flat brown patches  These are found on both sides of the face and have an irregular border  There are several distinct patterns    • Centrofacial pattern: forehead, cheeks, nose and upper lips   • Malar pattern: cheeks and nose   • Lateral cheek pattern   • Mandibular pattern: jawline   • Reddened or inflamed forms of melasma (also called erythrosis pigmentosa faciei)   • Poikiloderma of [place]: reddened, photoaging changes seen on the sides of the neck, mostly affecting patients older than 50 years   • Brachial type of melasma affecting shoulders and upper arms (also called acquired brachial cutaneous dyschromatosis)  Melasma is sometimes  into epidermal (skin surface), dermal (deeper) and mixed types  A Wood lamp that emits black light (UVA1) may be used to identify the depth of the pigment  Some general classifications include the following:    Epidermal melasma  • Well-defined border   • Dark brown colour   • Appears more obvious under black light   • Responds well to treatment    Dermal melasma  • Ill-defined border   • Light brown or bluish in colour   • Unchanged under black light   • Responds poorly to treatment\    Mixed melasma  • The most common type   • Combination of bluish, light and dark brown patches   • Mixed pattern seen under black light   • Partial improvement with treatment    What is the treatment of melasma? Melasma can be very slow to respond to treatment, especially if it has been present for a long time  Treatment may result in irritant contact dermatitis in patients with sensitive skin, and this can result in post-inflammatory pigmentation  Generally a combination of the following measures is helpful  General measures  • Discontinue hormonal contraception  • Year-round life-long sun protection  Wear a broad-brimmed hat  • Use broad-spectrum very high protection factor (SPF 50+) sunscreen applied to the whole face daily, year-round  It should be reapplied every 2 hours if outdoors during the summer months  Sunscreens containing iron oxides are preferred, as they screen out some visible light as well as ultraviolet radiation  Alternatively or as well, use a make-up that contains sunscreen  • Use a mild cleanser, and if the skin is dry, a light moisturiser  • Cosmetic camouflage (make-up) is invaluable to disguise the pigment      Topical therapy  Tyrosinase inhibitors are the mainstay of treatment  The aim is to prevent new pigment formation by inhibiting formation of melanin by the melanocytes  • Hydroquinone 2-4% as cream or lotion, applied accurately to pigmented areas at night for 2-4 months  This may cause contact dermatitis (stinging and redness) in 25% of patients  It should not be used in higher concentration or for prolonged courses as it has been associated with ochronosis (a bluish grey discolouration similar to that seen in alkaptonuria)  • Azelaic acid cream, lotion or gel can be applied twice daily long term, and is safe in pregnancy  This may also sting  • Kojic acid or kojic acid dipalmitate is often included in formulations, as it binds copper, required by L-DOPA (a cofactor of tyrosinase)  Kojic acid can cause irritant contact dermatitis and less commonly, allergic contact dermatitis  • The mechanism of action of cysteamine cream is unclear, but is thought to involve inhibition of tyrosinase  A study of 50 patients with melasma found cysteamine cream to be significantly more effective than placebo cream    • Ascorbic acid (vitamin C) also acts through copper to inhibit pigment production  It is well tolerated but highly unstable, so is usually combined with other agents  • Methimazole (antithyroid drug) cream has been reported to reduce melanin synthesis and pigmentation in hydroquinone-resistant melasma  • New agents under investigation include zinc sulfate mequinol, arbutin and deoxyarbutin (from berries), licorice extract, rucinol, resveratrol, 4-hydroxy-anisole, 2,5-dimethyl-4-hydroxy-3(2H)-furanone and/or N-acetyl glucosamine    Other active compounds used for melasma include:  • Topical corticosteroids such as hydrocortisone  These work quickly to fade the colour and reduce the likelihood of contact dermatitis caused by other agents  Potent topical steroids are best avoided due to their potential to cause adverse effects     • Soybean extract, which is thought to reduce the transfer of pigment from melanocytes to skin cells (keratinocytes) and to inhibit receptors  • Tranexamic acid has been used experimentally for melasma as a cream or injected into the skin (mesotherapy), showing some benefit  It may cause allergy or irritation  Superficial or epidermal pigment can be peeled off  Peeling can also allow tyrosinase inhibitors to penetrate more effectively  These must be done carefully as peels may also induce post-inflammatory pigmentation  • Topical alpha hydroxyacids including glycolic acid and lactic acid, as creams or as repeated superficial chemical peels, remove the surface skin and their low pH inhibits the activity of tyrosinase  • Topical retinoids, such as tretinoin (a prescription medicine) are effective  Tretinoin can be hard to tolerate and sometimes causes contact dermatitis  Do not use during pregnancy  • Salicylic acid, a common peeling ingredient in skin creams, can also be used for chemical peels, but it is not very effective in melasma  The most successful formulation has been a combination of hydroquinone, tretinoin, and moderate potency topical steroid  This has been found to result in improvement or clearance in up to 60-80% of those treated  Many other combinations of topical agents are in common use, as they are more effective than any one alone  However, these products are often expensive  Oral treatment of melasma  Oral medications for melasma are under investigation, including tranexamic acid  Tranexamic acid is a lysine analogue that inhibits plasmin and is usually used orally to stop bleeding  It reduces production of prostaglandins, the precursors of tyrosine  In low dose, tranexamic acid has been reported to be effective and safe in the treatment of melasma, providing patients have been carefully selected and are at low risk of thromboembolic disease    Glutathione is also under investigation as a systemic "skin whitening agent, but has potentially serious adverse effects  Devices used to treat melasma  The ideal treatment for melasma would destroy the pigment, while leaving the cells alone  Unfortunately, this is hard to achieve  Machines can be used to remove epidermal pigmentation but with caution--over-treatment may cause postinflammatory pigmentation  Patients should be pretreated with a tyrosinase inhibitor (see above)  Fractional lasers, Q-switched Nd:YAG lasers and intense pulsed light (IPL) appear to be the most suitable options  Several treatments may be necessary and post-inflammatory hyperpigmentation may complicate recovery  Carbon dioxide or erbium:YAG resurfacing lasers, pigment lasers (Q-switched gary and Alexandrite devices) and mechanical dermabrasion and microdermabrasion should be used with caution in the treatment of melasma  What is the outcome of treatment of melasma? Results take time and the above measures are rarely completely successful  Unfortunately, even in those that get a good result from treatment, pigmentation may reappear on exposure to summer sun and/or because of hormonal factors  New topical and oral agents are being studied and offer hope for effective treatments in the future  SEBORRHEIC KERATOSIS; NON-INFLAMED    Physical Exam:  • Anatomic Location Affected:  Mid chest  • Morphological Description:  Flat and raised, waxy, smooth to warty textured, yellow to brownish-grey to dark brown to blackish, discrete, \"stuck-on\" appearing papules  • Pertinent Positives:  • Pertinent Negatives: Additional History of Present Condition:  Patient reports new bumps on the skin  Denies itch, burn, pain, bleeding or ulceration  Present constantly; nothing seems to make it worse or better  No prior treatment        Assessment and Plan:  Based on a thorough discussion of this condition and the management approach to it (including a comprehensive discussion of the known risks, " "side effects and potential benefits of treatment), the patient (family) agrees to implement the following specific plan:  • Benign, assurance provided    Seborrheic Keratosis  A seborrheic keratosis is a harmless warty spot that appears during adult life as a common sign of skin aging  Seborrheic keratoses can arise on any area of skin, covered or uncovered, with the usual exception of the palms and soles  They do not arise from mucous membranes  Seborrheic keratoses can have highly variable appearance  Seborrheic keratoses are extremely common  It has been estimated that over 90% of adults over the age of 61 years have one or more of them  They occur in males and females of all races, typically beginning to erupt in the 35s or 45s  They are uncommon under the age of 21 years  The precise cause of seborrhoeic keratoses is not known  Seborrhoeic keratoses are considered degenerative in nature  As time goes by, seborrheic keratoses tend to become more numerous  Some people inherit a tendency to develop a very large number of them; some people may have hundreds of them  The name \"seborrheic keratosis\" is misleading, because these lesions are not limited to a seborrhoeic distribution (scalp, mid-face, chest, upper back), nor are they formed from sebaceous glands, nor are they associated with sebum -- which is greasy  Seborrheic keratosis may also be called \"SK,\" \"Seb K,\" \"basal cell papilloma,\" \"senile wart,\" or \"barnacle  \"      Researchers have noted:  • Eruptive seborrhoeic keratoses can follow sunburn or dermatitis  • Skin friction may be the reason they appear in body folds  • Viral cause (e g , human papillomavirus) seems unlikely  • Stable and clonal mutations or activation of FRFR3, PIK3CA, MARCO, AKT1 and EGFR genes are found in seborrhoeic keratoses  • Seborrhoeic keratosis can arise from solar lentigo  • FRFR3 mutations also arise in solar lentigines   These mutations are associated with increased age " "and location on the head and neck, suggesting a role of ultraviolet radiation in these lesions  • Seborrheic keratoses do not harbour tumour suppressor gene mutations  • Epidermal growth factor receptor inhibitors, which are used to treat some cancers, often result in an increase in verrucal (warty) keratoses  There is no easy way to remove multiple lesions on a single occasion  Unless a specific lesion is \"inflamed\" and is causing pain or stinging/burning or is bleeding, most insurance companies do not authorize treatment  Scribe Attestation    I,:  Angie Sheffield am acting as a scribe while in the presence of the attending physician :       I,:  Ricardo Mcdaniel MD personally performed the services described in this documentation    as scribed in my presence  :         "

## 2023-06-21 NOTE — PATIENT INSTRUCTIONS
FOLLOW UP MELASMA    Assessment and Plan:  Based on a thorough discussion of this condition and the management approach to it (including a comprehensive discussion of the known risks, side effects and potential benefits of treatment), the patient (family) agrees to implement the following specific plan:  [facility] Topical Compounding medication will be refilled today  Use three months straight and then take 3 month break before re starting  E mail was verified  Hydroquinone 8 %   Tretinoin 0 025%   Kojic Acid 1%   Niacinamide 4%   Fluocinolone 0 025%   Vehicle Cream   Size 60g  Neutrogena Tinted Sunscreen at least three times a day    What is melasma? Melasma is a chronic skin disorder that results in symmetrical, blotchy, brownish facial pigmentation  It can lead to considerable embarrassment and distress  This form of facial pigmentation is sometimes called chloasma, but as this means green skin, the term melasma (brown skin) is preferred  Who gets melasma? Melasma is more common in women than in men; only 1-in-4 to 1-in-20 affected individuals are male, depending on the population studied  It generally starts between the age of 21 and 36 years, but it can begin in childhood or not until middle age  Melasma is more common in people that tan well or have naturally brown skin (Man skin types 3 and 4) compared with those who have fair skin (skin types 1 and 2) or black skin (skin types 5 or 6)  What causes melasma? The cause of melasma is complex  The pigmentation is due to overproduction of melanin by the pigment cells, melanocytes, which is taken up by the keratinocytes (epidermal melanosis) and/or deposited in the dermis (dermal melanosis, melanophages)  There is a genetic predisposition to melasma, with at least one-third of patients reporting other family members to be affected  In most people melasma is a chronic disorder    Known triggers for melasma include:  Sun exposure and sun damage--this is the most important avoidable risk factor   Pregnancy--in affected women, the pigment often fades a few months after delivery   Hormone treatments--oral contraceptive pills containing oestrogen and/or progesterone, hormone replacement, intrauterine devices and implants are a factor in about a quarter of affected women   Certain medications (including new targeted therapies for cancer), scented or deodorant soaps, toiletries and cosmetics--these may cause a phototoxic reaction that triggers melasma, which may then persist long term   Hypothyroidism (low levels of circulating thyroid hormone)    Melasma commonly arises in healthy, non-pregnant adults  Lifelong sun exposure causes deposition of pigment within the dermis and this often persists longterm  Exposure to ultraviolet radiation (UVR) deepens the pigmentation because it activates the melanocytes to produce more melanin  Research is attempting to pinpoint the roles of stem cell, neural, vascular and local hormonal factors in promoting melanocyte activation  What are the clinical features of melasma? Melasma presents as macules (freckle-like spots) and larger flat brown patches  These are found on both sides of the face and have an irregular border  There are several distinct patterns  Centrofacial pattern: forehead, cheeks, nose and upper lips   Malar pattern: cheeks and nose   Lateral cheek pattern   Mandibular pattern: jawline   Reddened or inflamed forms of melasma (also called erythrosis pigmentosa faciei)   Poikiloderma of [place]: reddened, photoaging changes seen on the sides of the neck, mostly affecting patients older than 50 years   Brachial type of melasma affecting shoulders and upper arms (also called acquired brachial cutaneous dyschromatosis)  Melasma is sometimes  into epidermal (skin surface), dermal (deeper) and mixed types   A Wood lamp that emits black light (UVA1) may be used to identify the depth of the pigment  Some general classifications include the following:    Epidermal melasma  Well-defined border   Dark brown colour   Appears more obvious under black light   Responds well to treatment    Dermal melasma  Ill-defined border   Light brown or bluish in colour   Unchanged under black light   Responds poorly to treatment\    Mixed melasma  The most common type   Combination of bluish, light and dark brown patches   Mixed pattern seen under black light   Partial improvement with treatment    What is the treatment of melasma? Melasma can be very slow to respond to treatment, especially if it has been present for a long time  Treatment may result in irritant contact dermatitis in patients with sensitive skin, and this can result in post-inflammatory pigmentation  Generally a combination of the following measures is helpful  General measures  Discontinue hormonal contraception  Year-round life-long sun protection  Wear a broad-brimmed hat  Use broad-spectrum very high protection factor (SPF 50+) sunscreen applied to the whole face daily, year-round  It should be reapplied every 2 hours if outdoors during the summer months  Sunscreens containing iron oxides are preferred, as they screen out some visible light as well as ultraviolet radiation  Alternatively or as well, use a make-up that contains sunscreen  Use a mild cleanser, and if the skin is dry, a light moisturiser  Cosmetic camouflage (make-up) is invaluable to disguise the pigment  Topical therapy  Tyrosinase inhibitors are the mainstay of treatment  The aim is to prevent new pigment formation by inhibiting formation of melanin by the melanocytes  Hydroquinone 2-4% as cream or lotion, applied accurately to pigmented areas at night for 2-4 months  This may cause contact dermatitis (stinging and redness) in 25% of patients   It should not be used in higher concentration or for prolonged courses as it has been associated with ochronosis (a bluish grey discolouration similar to that seen in alkaptonuria)  Azelaic acid cream, lotion or gel can be applied twice daily long term, and is safe in pregnancy  This may also sting  Kojic acid or kojic acid dipalmitate is often included in formulations, as it binds copper, required by L-DOPA (a cofactor of tyrosinase)  Kojic acid can cause irritant contact dermatitis and less commonly, allergic contact dermatitis  The mechanism of action of cysteamine cream is unclear, but is thought to involve inhibition of tyrosinase  A study of 50 patients with melasma found cysteamine cream to be significantly more effective than placebo cream    Ascorbic acid (vitamin C) also acts through copper to inhibit pigment production  It is well tolerated but highly unstable, so is usually combined with other agents  Methimazole (antithyroid drug) cream has been reported to reduce melanin synthesis and pigmentation in hydroquinone-resistant melasma  New agents under investigation include zinc sulfate mequinol, arbutin and deoxyarbutin (from berries), licorice extract, rucinol, resveratrol, 4-hydroxy-anisole, 2,5-dimethyl-4-hydroxy-3(2H)-furanone and/or N-acetyl glucosamine    Other active compounds used for melasma include:  Topical corticosteroids such as hydrocortisone  These work quickly to fade the colour and reduce the likelihood of contact dermatitis caused by other agents  Potent topical steroids are best avoided due to their potential to cause adverse effects  Soybean extract, which is thought to reduce the transfer of pigment from melanocytes to skin cells (keratinocytes) and to inhibit receptors  Tranexamic acid has been used experimentally for melasma as a cream or injected into the skin (mesotherapy), showing some benefit  It may cause allergy or irritation  Superficial or epidermal pigment can be peeled off  Peeling can also allow tyrosinase inhibitors to penetrate more effectively   These must be done carefully as peels may also induce post-inflammatory pigmentation  Topical alpha hydroxyacids including glycolic acid and lactic acid, as creams or as repeated superficial chemical peels, remove the surface skin and their low pH inhibits the activity of tyrosinase  Topical retinoids, such as tretinoin (a prescription medicine) are effective  Tretinoin can be hard to tolerate and sometimes causes contact dermatitis  Do not use during pregnancy  Salicylic acid, a common peeling ingredient in skin creams, can also be used for chemical peels, but it is not very effective in melasma  The most successful formulation has been a combination of hydroquinone, tretinoin, and moderate potency topical steroid  This has been found to result in improvement or clearance in up to 60-80% of those treated  Many other combinations of topical agents are in common use, as they are more effective than any one alone  However, these products are often expensive  Oral treatment of melasma  Oral medications for melasma are under investigation, including tranexamic acid  Tranexamic acid is a lysine analogue that inhibits plasmin and is usually used orally to stop bleeding  It reduces production of prostaglandins, the precursors of tyrosine  In low dose, tranexamic acid has been reported to be effective and safe in the treatment of melasma, providing patients have been carefully selected and are at low risk of thromboembolic disease  Glutathione is also under investigation as a systemic skin whitening agent, but has potentially serious adverse effects  Devices used to treat melasma  The ideal treatment for melasma would destroy the pigment, while leaving the cells alone  Unfortunately, this is hard to achieve  Machines can be used to remove epidermal pigmentation but with caution--over-treatment may cause postinflammatory pigmentation  Patients should be pretreated with a tyrosinase inhibitor (see above)    Fractional lasers, Q-switched Nd:YAG lasers and intense pulsed light (IPL) appear to be the most suitable options  Several treatments may be necessary and post-inflammatory hyperpigmentation may complicate recovery  Carbon dioxide or erbium:YAG resurfacing lasers, pigment lasers (Q-switched gary and Alexandrite devices) and mechanical dermabrasion and microdermabrasion should be used with caution in the treatment of melasma  What is the outcome of treatment of melasma? Results take time and the above measures are rarely completely successful  Unfortunately, even in those that get a good result from treatment, pigmentation may reappear on exposure to summer sun and/or because of hormonal factors  New topical and oral agents are being studied and offer hope for effective treatments in the future  SEBORRHEIC KERATOSIS; NON-INFLAMED    Assessment and Plan:  Based on a thorough discussion of this condition and the management approach to it (including a comprehensive discussion of the known risks, side effects and potential benefits of treatment), the patient (family) agrees to implement the following specific plan:  Benign, assurance provided    Seborrheic Keratosis  A seborrheic keratosis is a harmless warty spot that appears during adult life as a common sign of skin aging  Seborrheic keratoses can arise on any area of skin, covered or uncovered, with the usual exception of the palms and soles  They do not arise from mucous membranes  Seborrheic keratoses can have highly variable appearance  Seborrheic keratoses are extremely common  It has been estimated that over 90% of adults over the age of 61 years have one or more of them  They occur in males and females of all races, typically beginning to erupt in the 35s or 45s  They are uncommon under the age of 21 years  The precise cause of seborrhoeic keratoses is not known  Seborrhoeic keratoses are considered degenerative in nature   As time goes by, seborrheic keratoses tend to "become more numerous  Some people inherit a tendency to develop a very large number of them; some people may have hundreds of them  The name \"seborrheic keratosis\" is misleading, because these lesions are not limited to a seborrhoeic distribution (scalp, mid-face, chest, upper back), nor are they formed from sebaceous glands, nor are they associated with sebum -- which is greasy  Seborrheic keratosis may also be called \"SK,\" \"Seb K,\" \"basal cell papilloma,\" \"senile wart,\" or \"barnacle  \"      Researchers have noted:  Eruptive seborrhoeic keratoses can follow sunburn or dermatitis  Skin friction may be the reason they appear in body folds  Viral cause (e g , human papillomavirus) seems unlikely  Stable and clonal mutations or activation of FRFR3, PIK3CA, MARCO, AKT1 and EGFR genes are found in seborrhoeic keratoses  Seborrhoeic keratosis can arise from solar lentigo  FRFR3 mutations also arise in solar lentigines  These mutations are associated with increased age and location on the head and neck, suggesting a role of ultraviolet radiation in these lesions  Seborrheic keratoses do not harbour tumour suppressor gene mutations  Epidermal growth factor receptor inhibitors, which are used to treat some cancers, often result in an increase in verrucal (warty) keratoses  There is no easy way to remove multiple lesions on a single occasion  Unless a specific lesion is \"inflamed\" and is causing pain or stinging/burning or is bleeding, most insurance companies do not authorize treatment      "

## 2024-01-02 ENCOUNTER — OFFICE VISIT (OUTPATIENT)
Dept: DERMATOLOGY | Facility: CLINIC | Age: 54
End: 2024-01-02
Payer: COMMERCIAL

## 2024-01-02 VITALS — BODY MASS INDEX: 33.66 KG/M2 | HEIGHT: 63 IN | TEMPERATURE: 97.9 F | WEIGHT: 190 LBS

## 2024-01-02 DIAGNOSIS — L68.9 EXCESSIVE HAIR: ICD-10-CM

## 2024-01-02 DIAGNOSIS — L81.1 MELASMA: Primary | ICD-10-CM

## 2024-01-02 PROCEDURE — 99214 OFFICE O/P EST MOD 30 MIN: CPT | Performed by: DERMATOLOGY

## 2024-01-02 RX ORDER — HYDROQUINONE 40 MG/G
CREAM TOPICAL 2 TIMES DAILY
Qty: 30 G | Refills: 3 | Status: SHIPPED | OUTPATIENT
Start: 2024-01-02

## 2024-01-02 RX ORDER — DICLOFENAC SODIUM 75 MG/1
75 TABLET, DELAYED RELEASE ORAL 2 TIMES DAILY
COMMUNITY
Start: 2023-12-20

## 2024-01-02 NOTE — PATIENT INSTRUCTIONS
FOLLOW UP MELASMA          Assessment and Plan:  Based on a thorough discussion of this condition and the management approach to it (including a comprehensive discussion of the known risks, side effects and potential benefits of treatment), the patient (family) agrees to implement the following specific plan:  [facility] Topical Compounding medication will be refilled today. Use three months straight and then take 3 month break before re starting. E mail was verified.   Hydroquinone 8 %   Tretinoin 0.025%   Kojic Acid 1%   Niacinamide 4%   Fluocinolone 0.025%   Vehicle Cream   Size 60g  Neutrogena Tinted Sunscreen at least three times a day, use moisturizer first then sunscreen on top. Or Coolibar Sunscreen   Discussed Laser treatment     What is melasma?  Melasma is a chronic skin disorder that results in symmetrical, blotchy, brownish facial pigmentation. It can lead to considerable embarrassment and distress.  This form of facial pigmentation is sometimes called chloasma, but as this means green skin, the term melasma (brown skin) is preferred.     Who gets melasma?  Melasma is more common in women than in men; only 1-in-4 to 1-in-20 affected individuals are male, depending on the population studied. It generally starts between the age of 20 and 40 years, but it can begin in childhood or not until middle age.  Melasma is more common in people that tan well or have naturally brown skin (Man skin types 3 and 4) compared with those who have fair skin (skin types 1 and 2) or black skin (skin types 5 or 6).     What causes melasma?  The cause of melasma is complex. The pigmentation is due to overproduction of melanin by the pigment cells, melanocytes, which is taken up by the keratinocytes (epidermal melanosis) and/or deposited in the dermis (dermal melanosis, melanophages). There is a genetic predisposition to melasma, with at least one-third of patients reporting other family members to be affected. In  most people melasma is a chronic disorder.  Known triggers for melasma include:  Sun exposure and sun damage--this is the most important avoidable risk factor   Pregnancy--in affected women, the pigment often fades a few months after delivery   Hormone treatments--oral contraceptive pills containing oestrogen and/or progesterone, hormone replacement, intrauterine devices and implants are a factor in about a quarter of affected women   Certain medications (including new targeted therapies for cancer), scented or deodorant soaps, toiletries and cosmetics--these may cause a phototoxic reaction that triggers melasma, which may then persist long term   Hypothyroidism (low levels of circulating thyroid hormone)     Melasma commonly arises in healthy, non-pregnant adults. Lifelong sun exposure causes deposition of pigment within the dermis and this often persists longterm. Exposure to ultraviolet radiation (UVR) deepens the pigmentation because it activates the melanocytes to produce more melanin.     Research is attempting to pinpoint the roles of stem cell, neural, vascular and local hormonal factors in promoting melanocyte activation.     What are the clinical features of melasma?  Melasma presents as macules (freckle-like spots) and larger flat brown patches.These are found on both sides of the face and have an irregular border. There are several distinct patterns.  Centrofacial pattern: forehead, cheeks, nose and upper lips   Malar pattern: cheeks and nose   Lateral cheek pattern   Mandibular pattern: jawline   Reddened or inflamed forms of melasma (also called erythrosis pigmentosa faciei)   Poikiloderma of [place]: reddened, photoaging changes seen on the sides of the neck, mostly affecting patients older than 50 years   Brachial type of melasma affecting shoulders and upper arms (also called acquired brachial cutaneous dyschromatosis).     Melasma is sometimes  into epidermal (skin surface), dermal  (deeper) and mixed types. A Wood lamp that emits black light (UVA1) may be used to identify the depth of the pigment.  Some general classifications include the following:     Epidermal melasma  Well-defined border   Dark brown colour   Appears more obvious under black light   Responds well to treatment     Dermal melasma  Ill-defined border   Light brown or bluish in colour   Unchanged under black light   Responds poorly to treatment\     Mixed melasma  The most common type   Combination of bluish, light and dark brown patches   Mixed pattern seen under black light   Partial improvement with treatment     What is the treatment of melasma?  Melasma can be very slow to respond to treatment, especially if it has been present for a long time. Treatment may result in irritant contact dermatitis in patients with sensitive skin, and this can result in post-inflammatory pigmentation.  Generally a combination of the following measures is helpful.     General measures  Discontinue hormonal contraception.   Year-round life-long sun protection. Wear a broad-brimmed hat.   Use broad-spectrum very high protection factor (SPF 50+) sunscreen applied to the whole face daily, year-round. It should be reapplied every 2 hours if outdoors during the summer months. Sunscreens containing iron oxides are preferred, as they screen out some visible light as well as ultraviolet radiation. Alternatively or as well, use a make-up that contains sunscreen.   Use a mild cleanser, and if the skin is dry, a light moisturiser.   Cosmetic camouflage (make-up) is invaluable to disguise the pigment.     Topical therapy  Tyrosinase inhibitors are the mainstay of treatment. The aim is to prevent new pigment formation by inhibiting formation of melanin by the melanocytes.  Hydroquinone 2-4% as cream or lotion, applied accurately to pigmented areas at night for 2-4 months. This may cause contact dermatitis (stinging and redness) in 25% of patients. It  should not be used in higher concentration or for prolonged courses as it has been associated with ochronosis (a bluish grey discolouration similar to that seen in alkaptonuria).   Azelaic acid cream, lotion or gel can be applied twice daily long term, and is safe in pregnancy. This may also sting.   Kojic acid or kojic acid dipalmitate is often included in formulations, as it binds copper, required by L-DOPA (a cofactor of tyrosinase). Kojic acid can cause irritant contact dermatitis and less commonly, allergic contact dermatitis.   The mechanism of action of cysteamine cream is unclear, but is thought to involve inhibition of tyrosinase. A study of 50 patients with melasma found cysteamine cream to be significantly more effective than placebo cream.   Ascorbic acid (vitamin C) also acts through copper to inhibit pigment production. It is well tolerated but highly unstable, so is usually combined with other agents.   Methimazole (antithyroid drug) cream has been reported to reduce melanin synthesis and pigmentation in hydroquinone-resistant melasma.   New agents under investigation include zinc sulfate mequinol, arbutin and deoxyarbutin (from berries), licorice extract, rucinol, resveratrol, 4-hydroxy-anisole, 2,5-dimethyl-4-hydroxy-3(2H)-furanone and/or N-acetyl glucosamine     Other active compounds used for melasma include:  Topical corticosteroids such as hydrocortisone. These work quickly to fade the colour and reduce the likelihood of contact dermatitis caused by other agents. Potent topical steroids are best avoided due to their potential to cause adverse effects.   Soybean extract, which is thought to reduce the transfer of pigment from melanocytes to skin cells (keratinocytes) and to inhibit receptors.   Tranexamic acid has been used experimentally for melasma as a cream or injected into the skin (mesotherapy), showing some benefit. It may cause allergy or irritation.     Superficial or epidermal pigment  can be peeled off. Peeling can also allow tyrosinase inhibitors to penetrate more effectively. These must be done carefully as peels may also induce post-inflammatory pigmentation.  Topical alpha hydroxyacids including glycolic acid and lactic acid, as creams or as repeated superficial chemical peels, remove the surface skin and their low pH inhibits the activity of tyrosinase.   Topical retinoids, such as tretinoin (a prescription medicine) are effective. Tretinoin can be hard to tolerate and sometimes causes contact dermatitis. Do not use during pregnancy.   Salicylic acid, a common peeling ingredient in skin creams, can also be used for chemical peels, but it is not very effective in melasma.     The most successful formulation has been a combination of hydroquinone, tretinoin, and moderate potency topical steroid. This has been found to result in improvement or clearance in up to 60-80% of those treated. Many other combinations of topical agents are in common use, as they are more effective than any one alone. However, these products are often expensive.     Oral treatment of melasma  Oral medications for melasma are under investigation, including tranexamic acid. Tranexamic acid is a lysine analogue that inhibits plasmin and is usually used orally to stop bleeding. It reduces production of prostaglandins, the precursors of tyrosine. In low dose, tranexamic acid has been reported to be effective and safe in the treatment of melasma, providing patients have been carefully selected and are at low risk of thromboembolic disease.  Glutathione is also under investigation as a systemic skin whitening agent, but has potentially serious adverse effects.     Devices used to treat melasma  The ideal treatment for melasma would destroy the pigment, while leaving the cells alone. Unfortunately, this is hard to achieve. Machines can be used to remove epidermal pigmentation but with caution--over-treatment may cause  postinflammatory pigmentation. Patients should be pretreated with a tyrosinase inhibitor (see above).  Fractional lasers, Q-switched Nd:YAG lasers and intense pulsed light (IPL) appear to be the most suitable options. Several treatments may be necessary and post-inflammatory hyperpigmentation may complicate recovery.  Carbon dioxide or erbium:YAG resurfacing lasers, pigment lasers (Q-switched gary and Alexandrite devices) and mechanical dermabrasion and microdermabrasion should be used with caution in the treatment of melasma.     What is the outcome of treatment of melasma?  Results take time and the above measures are rarely completely successful.  Unfortunately, even in those that get a good result from treatment, pigmentation may reappear on exposure to summer sun and/or because of hormonal factors. New topical and oral agents are being studied and offer hope for effective treatments in the future.    Excessive hair       Assessment and Plan:  Based on a thorough discussion of this condition and the management approach to it (including a comprehensive discussion of the known risks, side effects and potential benefits of treatment), the patient (family) agrees to implement the following specific plan:  Plan for laser hair reduction  Can see either Dr Nelson, or Dr Cortes at the [facility].   Consult fee is $150 if you do treatment that day $150 will be applied to cost of laser.

## 2024-01-02 NOTE — PROGRESS NOTES
"[facility] Dermatology Clinic Note     Patient Name: Ivis Garza  Encounter Date: 01/02/2024    Have you been cared for by a [facility] Dermatologist in the last 3 years and, if so, which description applies to you?    Yes.  I have been here within the last 3 years, and my medical history has NOT changed since that time.  I am FEMALE/of child-bearing potential.    REVIEW OF SYSTEMS:  Have you recently had or currently have any of the following? No changes in my recent health.   PAST MEDICAL HISTORY:  Have you personally ever had or currently have any of the following?  If \"YES,\" then please provide more detail. No changes in my medical history.   HISTORY OF IMMUNOSUPPRESSION: Do you have a history of any of the following:  Systemic Immunosuppression such as Diabetes, Biologic or Immunotherapy, Chemotherapy, Organ Transplantation, Bone Marrow Transplantation?  No     Answering \"YES\" requires the addition of the dotphrase \"IMMUNOSUPPRESSED\" as the first diagnosis of the patient's visit.   FAMILY HISTORY:  Any \"first degree relatives\" (parent, brother, sister, or child) with the following?    No changes in my family's known health.   PATIENT EXPERIENCE:    Do you want the Dermatologist to perform a COMPLETE skin exam today including a clinical examination under the \"bra and underwear\" areas?  NO  If necessary, do we have your permission to call and leave a detailed message on your Preferred Phone number that includes your specific medical information?  Yes      Allergies   Allergen Reactions    Penicillin G Benzathine Shortness Of Breath     Other reaction(s): (PIMS) shortness of breath    Erythromycin Rash     Other reaction(s): (PIMS)      Current Outpatient Medications:     albuterol (PROVENTIL HFA,VENTOLIN HFA) 90 mcg/act inhaler, Inhale 2 puffs every 6 (six) hours as needed, Disp: , Rfl:     aspirin 325 mg tablet, Take 325 mg by mouth daily, Disp: , Rfl:     atorvastatin (LIPITOR) 20 mg tablet, , Disp: , Rfl: "     betamethasone valerate (VALISONE) 0.1 % ointment, APPLY 1 APPLICATION IN BOTH EARS TWICE A DAY AS NEEDED, Disp: , Rfl:     Diclofenac Sodium (VOLTAREN) 1 %, , Disp: , Rfl:     famotidine (PEPCID) 20 mg tablet, Take 20 mg by mouth 2 (two) times a day, Disp: , Rfl:     fluticasone (FLONASE) 50 mcg/act nasal spray, SPRAY 2 SPRAYS INTO EACH NOSTRIL EVERY DAY, Disp: , Rfl:     ibuprofen (MOTRIN) 600 mg tablet, Take 600 mg by mouth Three times daily as needed, Disp: , Rfl:     omeprazole (PriLOSEC) 40 MG capsule, Take 40 mg by mouth daily (Patient not taking: Reported on 10/25/2023), Disp: , Rfl:     triamcinolone (KENALOG) 0.025 % cream, Apply topically daily (Patient not taking: Reported on 6/21/2023), Disp: 80 g, Rfl: 1          Whom besides the patient is providing clinical information about today's encounter?   NO ADDITIONAL HISTORIAN (patient alone provided history)    Physical Exam and Assessment/Plan by Diagnosis:    Patient here to follow up on Melasma     FOLLOW UP MELASMA     Physical Exam:  Anatomic Location Affected:  Cheeks and forehead  Morphological Description:  Light brown patches, improved from prior  Pertinent Positives:  Pertinent Negatives:     Additional History of Present Condition:  Last seen 06/21/2023. Patient reports using compounding cream from [facility] for 3 months on and three months off and that it has been helpful. Patient states Melasma does get better in the Winter. Patient has been using Neutrogena sunscreen.      Assessment and Plan:  Based on a thorough discussion of this condition and the management approach to it (including a comprehensive discussion of the known risks, side effects and potential benefits of treatment), the patient (family) agrees to implement the following specific plan:  [facility] Topical Compounding medication will be refilled today. Use three months straight and then take 3 month break before re starting.   SM20 hydroquinone 8%, tretinoin 0.1%,  kojic acid 1%, niacminamide 4%, fluocinolone 0.025% cream   Neutrogena Tinted Sunscreen at least three times a day, use moisturizer first then sunscreen on top. Or Coolibar Sunscreen   Discussed Laser treatment     What is melasma?  Melasma is a chronic skin disorder that results in symmetrical, blotchy, brownish facial pigmentation. It can lead to considerable embarrassment and distress.  This form of facial pigmentation is sometimes called chloasma, but as this means green skin, the term melasma (brown skin) is preferred.     Who gets melasma?  Melasma is more common in women than in men; only 1-in-4 to 1-in-20 affected individuals are male, depending on the population studied. It generally starts between the age of 20 and 40 years, but it can begin in childhood or not until middle age.  Melasma is more common in people that tan well or have naturally brown skin (Man skin types 3 and 4) compared with those who have fair skin (skin types 1 and 2) or black skin (skin types 5 or 6).     What causes melasma?  The cause of melasma is complex. The pigmentation is due to overproduction of melanin by the pigment cells, melanocytes, which is taken up by the keratinocytes (epidermal melanosis) and/or deposited in the dermis (dermal melanosis, melanophages). There is a genetic predisposition to melasma, with at least one-third of patients reporting other family members to be affected. In most people melasma is a chronic disorder.  Known triggers for melasma include:  Sun exposure and sun damage--this is the most important avoidable risk factor   Pregnancy--in affected women, the pigment often fades a few months after delivery   Hormone treatments--oral contraceptive pills containing oestrogen and/or progesterone, hormone replacement, intrauterine devices and implants are a factor in about a quarter of affected women   Certain medications (including new targeted therapies for cancer), scented or deodorant soaps,  toiletries and cosmetics--these may cause a phototoxic reaction that triggers melasma, which may then persist long term   Hypothyroidism (low levels of circulating thyroid hormone)     Melasma commonly arises in healthy, non-pregnant adults. Lifelong sun exposure causes deposition of pigment within the dermis and this often persists longterm. Exposure to ultraviolet radiation (UVR) deepens the pigmentation because it activates the melanocytes to produce more melanin.     Research is attempting to pinpoint the roles of stem cell, neural, vascular and local hormonal factors in promoting melanocyte activation.     What are the clinical features of melasma?  Melasma presents as macules (freckle-like spots) and larger flat brown patches.These are found on both sides of the face and have an irregular border. There are several distinct patterns.  Centrofacial pattern: forehead, cheeks, nose and upper lips   Malar pattern: cheeks and nose   Lateral cheek pattern   Mandibular pattern: jawline   Reddened or inflamed forms of melasma (also called erythrosis pigmentosa faciei)   Poikiloderma of [place]: reddened, photoaging changes seen on the sides of the neck, mostly affecting patients older than 50 years   Brachial type of melasma affecting shoulders and upper arms (also called acquired brachial cutaneous dyschromatosis).     Melasma is sometimes  into epidermal (skin surface), dermal (deeper) and mixed types. A Wood lamp that emits black light (UVA1) may be used to identify the depth of the pigment.  Some general classifications include the following:     Epidermal melasma  Well-defined border   Dark brown colour   Appears more obvious under black light   Responds well to treatment     Dermal melasma  Ill-defined border   Light brown or bluish in colour   Unchanged under black light   Responds poorly to treatment\     Mixed melasma  The most common type   Combination of bluish, light and dark brown patches   Mixed  pattern seen under black light   Partial improvement with treatment     What is the treatment of melasma?  Melasma can be very slow to respond to treatment, especially if it has been present for a long time. Treatment may result in irritant contact dermatitis in patients with sensitive skin, and this can result in post-inflammatory pigmentation.  Generally a combination of the following measures is helpful.     General measures  Discontinue hormonal contraception.   Year-round life-long sun protection. Wear a broad-brimmed hat.   Use broad-spectrum very high protection factor (SPF 50+) sunscreen applied to the whole face daily, year-round. It should be reapplied every 2 hours if outdoors during the summer months. Sunscreens containing iron oxides are preferred, as they screen out some visible light as well as ultraviolet radiation. Alternatively or as well, use a make-up that contains sunscreen.   Use a mild cleanser, and if the skin is dry, a light moisturiser.   Cosmetic camouflage (make-up) is invaluable to disguise the pigment.     Topical therapy  Tyrosinase inhibitors are the mainstay of treatment. The aim is to prevent new pigment formation by inhibiting formation of melanin by the melanocytes.  Hydroquinone 2-4% as cream or lotion, applied accurately to pigmented areas at night for 2-4 months. This may cause contact dermatitis (stinging and redness) in 25% of patients. It should not be used in higher concentration or for prolonged courses as it has been associated with ochronosis (a bluish grey discolouration similar to that seen in alkaptonuria).   Azelaic acid cream, lotion or gel can be applied twice daily long term, and is safe in pregnancy. This may also sting.   Kojic acid or kojic acid dipalmitate is often included in formulations, as it binds copper, required by L-DOPA (a cofactor of tyrosinase). Kojic acid can cause irritant contact dermatitis and less commonly, allergic contact dermatitis.   The  mechanism of action of cysteamine cream is unclear, but is thought to involve inhibition of tyrosinase. A study of 50 patients with melasma found cysteamine cream to be significantly more effective than placebo cream.   Ascorbic acid (vitamin C) also acts through copper to inhibit pigment production. It is well tolerated but highly unstable, so is usually combined with other agents.   Methimazole (antithyroid drug) cream has been reported to reduce melanin synthesis and pigmentation in hydroquinone-resistant melasma.   New agents under investigation include zinc sulfate mequinol, arbutin and deoxyarbutin (from berries), licorice extract, rucinol, resveratrol, 4-hydroxy-anisole, 2,5-dimethyl-4-hydroxy-3(2H)-furanone and/or N-acetyl glucosamine     Other active compounds used for melasma include:  Topical corticosteroids such as hydrocortisone. These work quickly to fade the colour and reduce the likelihood of contact dermatitis caused by other agents. Potent topical steroids are best avoided due to their potential to cause adverse effects.   Soybean extract, which is thought to reduce the transfer of pigment from melanocytes to skin cells (keratinocytes) and to inhibit receptors.   Tranexamic acid has been used experimentally for melasma as a cream or injected into the skin (mesotherapy), showing some benefit. It may cause allergy or irritation.     Superficial or epidermal pigment can be peeled off. Peeling can also allow tyrosinase inhibitors to penetrate more effectively. These must be done carefully as peels may also induce post-inflammatory pigmentation.  Topical alpha hydroxyacids including glycolic acid and lactic acid, as creams or as repeated superficial chemical peels, remove the surface skin and their low pH inhibits the activity of tyrosinase.   Topical retinoids, such as tretinoin (a prescription medicine) are effective. Tretinoin can be hard to tolerate and sometimes causes contact dermatitis. Do not  use during pregnancy.   Salicylic acid, a common peeling ingredient in skin creams, can also be used for chemical peels, but it is not very effective in melasma.     The most successful formulation has been a combination of hydroquinone, tretinoin, and moderate potency topical steroid. This has been found to result in improvement or clearance in up to 60-80% of those treated. Many other combinations of topical agents are in common use, as they are more effective than any one alone. However, these products are often expensive.     Oral treatment of melasma  Oral medications for melasma are under investigation, including tranexamic acid. Tranexamic acid is a lysine analogue that inhibits plasmin and is usually used orally to stop bleeding. It reduces production of prostaglandins, the precursors of tyrosine. In low dose, tranexamic acid has been reported to be effective and safe in the treatment of melasma, providing patients have been carefully selected and are at low risk of thromboembolic disease.  Glutathione is also under investigation as a systemic skin whitening agent, but has potentially serious adverse effects.     Devices used to treat melasma  The ideal treatment for melasma would destroy the pigment, while leaving the cells alone. Unfortunately, this is hard to achieve. Machines can be used to remove epidermal pigmentation but with caution--over-treatment may cause postinflammatory pigmentation. Patients should be pretreated with a tyrosinase inhibitor (see above).  Fractional lasers, Q-switched Nd:YAG lasers and intense pulsed light (IPL) appear to be the most suitable options. Several treatments may be necessary and post-inflammatory hyperpigmentation may complicate recovery.  Carbon dioxide or erbium:YAG resurfacing lasers, pigment lasers (Q-switched gary and Alexandrite devices) and mechanical dermabrasion and microdermabrasion should be used with caution in the treatment of melasma.     What is the  outcome of treatment of melasma?  Results take time and the above measures are rarely completely successful.  Unfortunately, even in those that get a good result from treatment, pigmentation may reappear on exposure to summer sun and/or because of hormonal factors. New topical and oral agents are being studied and offer hope for effective treatments in the future.    Excessive Hair   Physical Exam:  Anatomic Location Affected:  upper lip, chin   Morphological Description:  hairs   Pertinent Positives:  Pertinent Negatives:    Additional History of Present Condition:  Patient interested in laser hair reduction     Assessment and Plan:  Based on a thorough discussion of this condition and the management approach to it (including a comprehensive discussion of the known risks, side effects and potential benefits of treatment), the patient (family) agrees to implement the following specific plan:  Plan for laser hair reduction  Can see either Dr Nelson, or Dr Cortes at the [facility].   Consult fee is $150 if you do treatment that day $150 will be applied to cost of laser.   Scribe Attestation      I,:  Hazel Adams MA am acting as a scribe while in the presence of the attending physician.:       I,:  Gail Abdi MD personally performed the services described in this documentation    as scribed in my presence.: